# Patient Record
Sex: MALE | Race: WHITE | NOT HISPANIC OR LATINO | Employment: FULL TIME | URBAN - METROPOLITAN AREA
[De-identification: names, ages, dates, MRNs, and addresses within clinical notes are randomized per-mention and may not be internally consistent; named-entity substitution may affect disease eponyms.]

---

## 2023-03-06 ENCOUNTER — PRE-ADMISSION TESTING (OUTPATIENT)
Dept: PREADMISSION TESTING | Facility: HOSPITAL | Age: 68
End: 2023-03-06
Payer: MEDICARE

## 2023-03-06 VITALS
SYSTOLIC BLOOD PRESSURE: 140 MMHG | OXYGEN SATURATION: 94 % | HEIGHT: 70 IN | DIASTOLIC BLOOD PRESSURE: 88 MMHG | WEIGHT: 210.98 LBS | HEART RATE: 115 BPM | BODY MASS INDEX: 30.2 KG/M2 | RESPIRATION RATE: 20 BRPM

## 2023-03-06 LAB
ANION GAP SERPL CALCULATED.3IONS-SCNC: 11.7 MMOL/L (ref 5–15)
BUN SERPL-MCNC: 21 MG/DL (ref 8–23)
BUN/CREAT SERPL: 17.9 (ref 7–25)
CALCIUM SPEC-SCNC: 9 MG/DL (ref 8.6–10.5)
CHLORIDE SERPL-SCNC: 100 MMOL/L (ref 98–107)
CO2 SERPL-SCNC: 22.3 MMOL/L (ref 22–29)
CREAT SERPL-MCNC: 1.17 MG/DL (ref 0.76–1.27)
DEPRECATED RDW RBC AUTO: 48.7 FL (ref 37–54)
EGFRCR SERPLBLD CKD-EPI 2021: 69.2 ML/MIN/1.73
ERYTHROCYTE [DISTWIDTH] IN BLOOD BY AUTOMATED COUNT: 18.1 % (ref 12.3–15.4)
GLUCOSE SERPL-MCNC: 110 MG/DL (ref 65–99)
HCT VFR BLD AUTO: 50.9 % (ref 37.5–51)
HGB BLD-MCNC: 15.7 G/DL (ref 13–17.7)
MCH RBC QN AUTO: 24.8 PG (ref 26.6–33)
MCHC RBC AUTO-ENTMCNC: 30.8 G/DL (ref 31.5–35.7)
MCV RBC AUTO: 80.3 FL (ref 79–97)
PLATELET # BLD AUTO: 167 10*3/MM3 (ref 140–450)
PMV BLD AUTO: 9.7 FL (ref 6–12)
POTASSIUM SERPL-SCNC: 3.8 MMOL/L (ref 3.5–5.2)
QT INTERVAL: 342 MS
RBC # BLD AUTO: 6.34 10*6/MM3 (ref 4.14–5.8)
SODIUM SERPL-SCNC: 134 MMOL/L (ref 136–145)
WBC NRBC COR # BLD: 4.24 10*3/MM3 (ref 3.4–10.8)

## 2023-03-06 PROCEDURE — 93010 ELECTROCARDIOGRAM REPORT: CPT | Performed by: INTERNAL MEDICINE

## 2023-03-06 PROCEDURE — 93005 ELECTROCARDIOGRAM TRACING: CPT

## 2023-03-06 PROCEDURE — 80048 BASIC METABOLIC PNL TOTAL CA: CPT | Performed by: ORTHOPAEDIC SURGERY

## 2023-03-06 PROCEDURE — 85027 COMPLETE CBC AUTOMATED: CPT | Performed by: ORTHOPAEDIC SURGERY

## 2023-03-06 PROCEDURE — 36415 COLL VENOUS BLD VENIPUNCTURE: CPT

## 2023-03-06 RX ORDER — CARVEDILOL 12.5 MG/1
1 TABLET ORAL EVERY 12 HOURS SCHEDULED
COMMUNITY
Start: 2023-01-04

## 2023-03-06 RX ORDER — CETIRIZINE HYDROCHLORIDE 10 MG/1
1 TABLET ORAL DAILY
COMMUNITY
Start: 2023-02-27

## 2023-03-06 RX ORDER — PREGABALIN 300 MG/1
CAPSULE ORAL
COMMUNITY
Start: 2023-02-26

## 2023-03-06 RX ORDER — TESTOSTERONE ENANTHATE 200 MG/ML
INJECTION, SOLUTION INTRAMUSCULAR
COMMUNITY

## 2023-03-06 RX ORDER — LISINOPRIL 5 MG/1
1 TABLET ORAL DAILY
COMMUNITY
Start: 2023-01-04

## 2023-03-06 RX ORDER — ASPIRIN 81 MG/1
81 TABLET, CHEWABLE ORAL DAILY
COMMUNITY

## 2023-03-06 RX ORDER — MULTIPLE VITAMINS W/ MINERALS TAB 9MG-400MCG
1 TAB ORAL DAILY
COMMUNITY

## 2023-03-06 RX ORDER — DEXTROAMPHETAMINE SACCHARATE, AMPHETAMINE ASPARTATE, DEXTROAMPHETAMINE SULFATE AND AMPHETAMINE SULFATE 5; 5; 5; 5 MG/1; MG/1; MG/1; MG/1
1 TABLET ORAL 3 TIMES DAILY
COMMUNITY
Start: 2023-02-14

## 2023-03-06 NOTE — PAT
Pt here for PAT visit.  Pre-op tests completed, chg soap given, and instructions reviewed.  Instructed clears until 2 hrs prior to arrival time, voiced understanding. ERAS handouts given and reviewed with pt

## 2023-03-06 NOTE — DISCHARGE INSTRUCTIONS
PRE-ADMISSION TESTING INSTRUCTIONS FOR ADULTS    Take these medications the morning of surgery with a small sip of water: carvedilol,       Do not take any insulin or diabetes medications the morning of surgery.      No aspirin, advil, aleve, ibuprofen, naproxen, diet pills, decongestants, or herbal/vitamins for a week prior to surgery.       Tylenol/Acetaminophen is okay to take if needed.    General Instructions:    DO NOT EAT SOLID FOOD AFTER MIDNIGHT THE NIGHT BEFORE SURGERY. No gum, mints, or hard candy after midnight the night before surgery.  You may drink clear liquids the day of surgery up until 2 hours before your arrival time.  Clear liquids are liquids you can see through. Nothing RED in color.    Plain water    Sports drinks      Gelatin (Jell-O)  Fruit juices without pulp such as white grape juice and apple juice  Popsicles that contain no fruit or yogurt  Tea or coffee (no cream or milk added)    It is beneficial for you to have a clear drink that contains carbohydrates 2 hours before your arrival time.  We suggest a 20 ounce bottle of Gatorade or Powerade for non-diabetic patients or a 20 ounce bottle of Gatorade Zero or Powerade Zero for diabetic patients.     Patients who avoid smoking, chewing tobacco and alcohol for 4 weeks prior to surgery have a reduced risk of post-operative complications.  If at all possible, quit smoking as many days before surgery as you can.    Do not smoke, use chewing tobacco or drink alcohol the day of surgery    Bring your C-PAP/ BI-PAP machine if you use one.  Wear clean comfortable clothes and socks.  Do not wear contact lenses, lotion, deodorant, or make-up.  Bring a case for your glasses if applicable. You may brush your teeth the morning of surgery.  You may wear dentures/partials, do not put adhesive/glue on them.  Leave all other jewelry and valuables at home.      Preventing a Surgical Site Infection:    Shower the night before and on the morning of surgery  using the chlorhexidine soap you were given.  Use a clean washcloth with the soap.  Place clean sheets on your bed after showering the night before surgery. Do not use the CHG soap on your hair, face, or private areas. Wash your body gently for five (5) minutes. Do not scrub your skin.  Dry with a clean towel and dress in clean clothing.  Do not shave the surgical area for 10 days-2 weeks prior to surgery  because the razor can irritate skin and make it easier to develop an infection.  Make sure you, your family, and all healthcare providers clean their hands with soap and water or an alcohol based hand  before caring for you or your wound.      Day of surgery:    Your surgeon’s office will advise you of your arrival time for the day of surgery.    Upon arrival, a Pre-op nurse and Anesthesia provider will review your health history, obtain vital signs, and answer questions you may have. The anesthesia provider will also discuss the type of anesthesia that will be needed for your procedure, which may include general anesthesia. The only belongings needed at this time will be your home medications and if applicable your C-PAP/BI-PAP machine.  If you are staying overnight your family can leave the rest of your belongings in the car and bring them to your room later.  A Pre-op nurse will start an IV and you may receive medication in preparation for surgery, including something to help you relax.  Your family will be able to see you in the Pre-op area.  While you are in surgery your family should notify the waiting room  if they leave the waiting room area and provide a contact phone number.    IF you have any questions, you can call the Pre-Admission Department at [phone] or your surgeon's office.  Notify your surgeon if  you become sick, have a fever, productive cough, or cannot be here the day of surgery    Please be aware that surgery does come with discomfort.  We want to make every  effort to control your discomfort so please discuss any uncontrolled symptoms with your nurse.   Your doctor will most likely have prescribed pain medications.      If you are going home after surgery, you will receive individualized written care instructions before being discharged.  A responsible adult (over the age of 18) must drive you to and from the hospital on the day of your surgery and stay with you for 24 hours after anesthesia.    If you are staying overnight following surgery, you will be transported to your hospital room following the recovery period.  [facility] has all private rooms.    You may receive a survey regarding the care you received. Your feedback is very important and will be used to collect the necessary data to help us to continue to provide excellent care.     Deductibles and co-payments are collected on the day of service. Please be prepared to pay the required co-pay, deductible or deposit on the day of service as defined by your plan.

## 2023-03-15 NOTE — DISCHARGE PLACEMENT REQUEST
"Nancy Roverto (67 y.o. Male)     Date of Birth   1955    Social Security Number       Address   [address] [place] [address]    Home Phone   [phone]    MRN   [id number]       Yarsanism   Caodaism    Marital Status                               Admission Date       Admission Type   Elective    Admitting Provider   Roverto Mukherjee MD    Attending Provider   Roverto Mukherjee MD    Department, Room/Bed   [facility], --/--       Discharge Date       Discharge Disposition       Discharge Destination                               Attending Provider: Roverto Mukherjee MD    Allergies: Demerol [Meperidine]    Isolation: None   Infection: None   Code Status: Not on file    Ht: 177.8 cm (70\")   Wt: 95.7 kg (210 lb 15.7 oz)    Admission Cmt: None   Principal Problem: None                Active Insurance as of 3/20/2023     Primary Coverage     Payor Plan Insurance Group Employer/Plan Group    HUMANA MEDICARE REPLACEMENT HUMANA MEDICARE REPLACEMENT [id number]     Payor Plan Address Payor Plan Phone Number Payor Plan Fax Number Effective Dates    [address] [address] [phone]  1/1/2023 - None Entered    [facility] [id number]       Subscriber Name Subscriber Birth Date Member ID       ROVERTO CRUZ 1955 [id number]                 Emergency Contacts      (Rel.) Home Phone Work Phone Mobile Phone    Erin Cruz (Spouse) -- -- [phone]          "

## 2023-03-15 NOTE — CASE MANAGEMENT/SOCIAL WORK
Continued Stay Note  ERASMO Hernández     Patient Name: Frederick Cruz  MRN: [id number]  Today's Date: 3/15/2023    Admit Date: (Not on file)    [facility]    Discharge Plan     Row Name 03/15/23 1520       Plan    Plan Home with wife and [facility]    Patient/Family in Agreement with Plan yes    Plan Comments CM spoke with patient today regarding pre admission discharge planning for his surgery with Dr. Mukherjee on 3/20/23. Patient states he lives with his wife Nasreen in a single story house with one step and handrail to gain entry. He states he currently has no issues entering the home or maneuvering inside. He states his wife will be able to assist with needs at home and provide ride at discharge. CM informed patient that Karolina has arrange for [facility] to follow for PT at home and he is agreeable this service. Referral entered into EPIC. We then discussed DME and he states he has a rolling walker, BSC, shower chair, straight cane and quad cane at home and states he will not need any other equipment at discharge. Patient states he plans on returning home with his wife to help and [facility] to follow for PT. CM will follow.               Discharge Codes    No documentation.                     Melyssa Kauffman RN

## 2023-03-17 ENCOUNTER — ANESTHESIA EVENT (OUTPATIENT)
Dept: PERIOP | Facility: HOSPITAL | Age: 68
DRG: 468 | End: 2023-03-17
Payer: MEDICARE

## 2023-03-20 ENCOUNTER — ANESTHESIA (OUTPATIENT)
Dept: PERIOP | Facility: HOSPITAL | Age: 68
DRG: 468 | End: 2023-03-20
Payer: MEDICARE

## 2023-03-20 ENCOUNTER — HOSPITAL ENCOUNTER (INPATIENT)
Facility: HOSPITAL | Age: 68
LOS: 1 days | Discharge: HOME HEALTH | DRG: 468 | End: 2023-03-21
Attending: ORTHOPAEDIC SURGERY | Admitting: ORTHOPAEDIC SURGERY
Payer: MEDICARE

## 2023-03-20 ENCOUNTER — APPOINTMENT (OUTPATIENT)
Dept: GENERAL RADIOLOGY | Facility: HOSPITAL | Age: 68
DRG: 468 | End: 2023-03-20
Payer: MEDICARE

## 2023-03-20 DIAGNOSIS — T84.84XA PAIN IN PROSTHETIC JOINT: ICD-10-CM

## 2023-03-20 PROBLEM — T84.012S FAILED TOTAL KNEE, RIGHT, SEQUELA: Status: ACTIVE | Noted: 2023-03-20

## 2023-03-20 PROCEDURE — 25010000002 TRIAMCINOLONE PER 10 MG: Performed by: ORTHOPAEDIC SURGERY

## 2023-03-20 PROCEDURE — 0 CEFAZOLIN SODIUM-DEXTROSE 2-3 GM-%(50ML) RECONSTITUTED SOLUTION: Performed by: ORTHOPAEDIC SURGERY

## 2023-03-20 PROCEDURE — C1776 JOINT DEVICE (IMPLANTABLE): HCPCS | Performed by: ORTHOPAEDIC SURGERY

## 2023-03-20 PROCEDURE — 25010000002 PHENYLEPHRINE 10 MG/ML SOLUTION 1 ML VIAL: Performed by: NURSE ANESTHETIST, CERTIFIED REGISTERED

## 2023-03-20 PROCEDURE — 25010000002 PHENYLEPHRINE 10 MG/ML SOLUTION: Performed by: NURSE ANESTHETIST, CERTIFIED REGISTERED

## 2023-03-20 PROCEDURE — 25010000002 PROPOFOL 200 MG/20ML EMULSION: Performed by: NURSE ANESTHETIST, CERTIFIED REGISTERED

## 2023-03-20 PROCEDURE — 25010000002 ROPIVACAINE PER 1 MG: Performed by: ORTHOPAEDIC SURGERY

## 2023-03-20 PROCEDURE — 0SPC0JZ REMOVAL OF SYNTHETIC SUBSTITUTE FROM RIGHT KNEE JOINT, OPEN APPROACH: ICD-10-PCS | Performed by: ORTHOPAEDIC SURGERY

## 2023-03-20 PROCEDURE — 25010000002 MAGNESIUM SULFATE PER 500 MG OF MAGNESIUM: Performed by: NURSE ANESTHETIST, CERTIFIED REGISTERED

## 2023-03-20 PROCEDURE — C1713 ANCHOR/SCREW BN/BN,TIS/BN: HCPCS | Performed by: ORTHOPAEDIC SURGERY

## 2023-03-20 PROCEDURE — 94799 UNLISTED PULMONARY SVC/PX: CPT

## 2023-03-20 PROCEDURE — 25010000002 VANCOMYCIN 1.5 G RECONSTITUTED SOLUTION 1 EACH VIAL: Performed by: ORTHOPAEDIC SURGERY

## 2023-03-20 PROCEDURE — 25010000002 FENTANYL CITRATE (PF) 100 MCG/2ML SOLUTION: Performed by: NURSE ANESTHETIST, CERTIFIED REGISTERED

## 2023-03-20 PROCEDURE — 99221 1ST HOSP IP/OBS SF/LOW 40: CPT | Performed by: HOSPITALIST

## 2023-03-20 PROCEDURE — 87070 CULTURE OTHR SPECIMN AEROBIC: CPT | Performed by: ORTHOPAEDIC SURGERY

## 2023-03-20 PROCEDURE — 25010000002 MIDAZOLAM PER 1MG: Performed by: NURSE ANESTHETIST, CERTIFIED REGISTERED

## 2023-03-20 PROCEDURE — 25010000002 DEXAMETHASONE PER 1 MG: Performed by: NURSE ANESTHETIST, CERTIFIED REGISTERED

## 2023-03-20 PROCEDURE — 94761 N-INVAS EAR/PLS OXIMETRY MLT: CPT

## 2023-03-20 PROCEDURE — 25010000002 ONDANSETRON PER 1 MG: Performed by: NURSE ANESTHETIST, CERTIFIED REGISTERED

## 2023-03-20 PROCEDURE — 87075 CULTR BACTERIA EXCEPT BLOOD: CPT | Performed by: ORTHOPAEDIC SURGERY

## 2023-03-20 PROCEDURE — 0SRC0J9 REPLACEMENT OF RIGHT KNEE JOINT WITH SYNTHETIC SUBSTITUTE, CEMENTED, OPEN APPROACH: ICD-10-PCS | Performed by: ORTHOPAEDIC SURGERY

## 2023-03-20 PROCEDURE — 73560 X-RAY EXAM OF KNEE 1 OR 2: CPT

## 2023-03-20 PROCEDURE — 25010000002 KETOROLAC TROMETHAMINE PER 15 MG: Performed by: ORTHOPAEDIC SURGERY

## 2023-03-20 PROCEDURE — 87176 TISSUE HOMOGENIZATION CULTR: CPT | Performed by: ORTHOPAEDIC SURGERY

## 2023-03-20 PROCEDURE — 25010000002 EPINEPHRINE 1 MG/ML SOLUTION 1 ML VIAL: Performed by: ORTHOPAEDIC SURGERY

## 2023-03-20 PROCEDURE — 87205 SMEAR GRAM STAIN: CPT | Performed by: ORTHOPAEDIC SURGERY

## 2023-03-20 PROCEDURE — 25010000002 VANCOMYCIN 1 G RECONSTITUTED SOLUTION: Performed by: ORTHOPAEDIC SURGERY

## 2023-03-20 DEVICE — CMT BONE PALACOS RPLSG W/GENT HI/VISC 1X40: Type: IMPLANTABLE DEVICE | Site: KNEE | Status: FUNCTIONAL

## 2023-03-20 DEVICE — LEGION OXINIUM CONSTRAINED FEMORAL                                    SIZE 6 RIGHT
Type: IMPLANTABLE DEVICE | Site: KNEE | Status: FUNCTIONAL
Brand: LEGION

## 2023-03-20 DEVICE — GENESIS II CONSTRAINED ART INSERT                                    SIZE 5-6 11MM
Type: IMPLANTABLE DEVICE | Site: KNEE | Status: FUNCTIONAL
Brand: GENESIS II

## 2023-03-20 DEVICE — LEGION TIB CONE ID 22 SHORT
Type: IMPLANTABLE DEVICE | Site: KNEE | Status: FUNCTIONAL
Brand: LEGION

## 2023-03-20 DEVICE — LEGION PRESSFIT STEM 18MM X 160MM
Type: IMPLANTABLE DEVICE | Site: KNEE | Status: FUNCTIONAL
Brand: LEGION

## 2023-03-20 DEVICE — LEGION SCREW-ON DISTAL FEM WDG SZ6 10MM
Type: IMPLANTABLE DEVICE | Site: KNEE | Status: FUNCTIONAL
Brand: LEGION

## 2023-03-20 DEVICE — LEGION SCREW ON HEMI STEPPED                                    TIBIAL WEDGE SIZE 5-6 5MM LEFT                                    MEDIAL/RIGHT LATERAL
Type: IMPLANTABLE DEVICE | Site: KNEE | Status: FUNCTIONAL
Brand: LEGION

## 2023-03-20 DEVICE — LEGION SCREW ON HEMI STEPPED                                    TIBIAL WEDGE SIZE 5-6 5MM LEFT                                    LATERAL/RIGHT MEDIAL
Type: IMPLANTABLE DEVICE | Site: KNEE | Status: FUNCTIONAL
Brand: LEGION

## 2023-03-20 DEVICE — DEV CONTRL TISS STRATAFIX SPIRAL PDO BIDIR 1 36X36CM: Type: IMPLANTABLE DEVICE | Site: KNEE | Status: FUNCTIONAL

## 2023-03-20 DEVICE — DEV CONTRL TISS STRATAFIX SPIRAL PDS PLS CT1 2-0 45CM: Type: IMPLANTABLE DEVICE | Site: KNEE | Status: FUNCTIONAL

## 2023-03-20 DEVICE — LEGION PRESSFIT STEM 15MM X 120MM
Type: IMPLANTABLE DEVICE | Site: KNEE | Status: FUNCTIONAL
Brand: LEGION

## 2023-03-20 DEVICE — LEGION REVISION TIBIAL BASEPLATE                                    SIZE 5 RIGHT
Type: IMPLANTABLE DEVICE | Site: KNEE | Status: FUNCTIONAL
Brand: LEGION

## 2023-03-20 DEVICE — LEGION SCREW-ON LWEDGE 10MM DISTAL                                    X 5MM POSTERIOR SIZE 6
Type: IMPLANTABLE DEVICE | Site: KNEE | Status: FUNCTIONAL
Brand: LEGION

## 2023-03-20 RX ORDER — OXYCODONE HYDROCHLORIDE 5 MG/1
10 TABLET ORAL EVERY 4 HOURS PRN
Status: DISCONTINUED | OUTPATIENT
Start: 2023-03-20 | End: 2023-03-21 | Stop reason: HOSPADM

## 2023-03-20 RX ORDER — MIDAZOLAM HYDROCHLORIDE 2 MG/2ML
1 INJECTION, SOLUTION INTRAMUSCULAR; INTRAVENOUS
Status: DISCONTINUED | OUTPATIENT
Start: 2023-03-20 | End: 2023-03-20 | Stop reason: HOSPADM

## 2023-03-20 RX ORDER — TRANEXAMIC ACID 10 MG/ML
INJECTION, SOLUTION INTRAVENOUS AS NEEDED
Status: DISCONTINUED | OUTPATIENT
Start: 2023-03-20 | End: 2023-03-20 | Stop reason: SURG

## 2023-03-20 RX ORDER — PHENYLEPHRINE HYDROCHLORIDE 10 MG/ML
INJECTION INTRAVENOUS AS NEEDED
Status: DISCONTINUED | OUTPATIENT
Start: 2023-03-20 | End: 2023-03-20 | Stop reason: SURG

## 2023-03-20 RX ORDER — DEXAMETHASONE SODIUM PHOSPHATE 4 MG/ML
4 INJECTION, SOLUTION INTRA-ARTICULAR; INTRALESIONAL; INTRAMUSCULAR; INTRAVENOUS; SOFT TISSUE ONCE
Status: COMPLETED | OUTPATIENT
Start: 2023-03-20 | End: 2023-03-20

## 2023-03-20 RX ORDER — OXYCODONE HYDROCHLORIDE AND ACETAMINOPHEN 5; 325 MG/1; MG/1
1 TABLET ORAL ONCE AS NEEDED
Status: DISCONTINUED | OUTPATIENT
Start: 2023-03-20 | End: 2023-03-20 | Stop reason: HOSPADM

## 2023-03-20 RX ORDER — SODIUM CHLORIDE AND POTASSIUM CHLORIDE 150; 900 MG/100ML; MG/100ML
100 INJECTION, SOLUTION INTRAVENOUS CONTINUOUS
Status: DISCONTINUED | OUTPATIENT
Start: 2023-03-20 | End: 2023-03-21 | Stop reason: HOSPADM

## 2023-03-20 RX ORDER — NALOXONE HCL 0.4 MG/ML
0.4 VIAL (ML) INJECTION
Status: DISCONTINUED | OUTPATIENT
Start: 2023-03-20 | End: 2023-03-21 | Stop reason: HOSPADM

## 2023-03-20 RX ORDER — CHOLECALCIFEROL (VITAMIN D3) 125 MCG
5 CAPSULE ORAL NIGHTLY PRN
Status: DISCONTINUED | OUTPATIENT
Start: 2023-03-20 | End: 2023-03-21 | Stop reason: HOSPADM

## 2023-03-20 RX ORDER — BUPIVACAINE HYDROCHLORIDE 2.5 MG/ML
INJECTION, SOLUTION EPIDURAL; INFILTRATION; INTRACAUDAL
Status: COMPLETED | OUTPATIENT
Start: 2023-03-20 | End: 2023-03-20

## 2023-03-20 RX ORDER — KETAMINE HYDROCHLORIDE 10 MG/ML
INJECTION INTRAMUSCULAR; INTRAVENOUS AS NEEDED
Status: DISCONTINUED | OUTPATIENT
Start: 2023-03-20 | End: 2023-03-20 | Stop reason: SURG

## 2023-03-20 RX ORDER — SODIUM CHLORIDE 9 MG/ML
40 INJECTION, SOLUTION INTRAVENOUS AS NEEDED
Status: DISCONTINUED | OUTPATIENT
Start: 2023-03-20 | End: 2023-03-20 | Stop reason: HOSPADM

## 2023-03-20 RX ORDER — LIDOCAINE HYDROCHLORIDE 20 MG/ML
INJECTION, SOLUTION INFILTRATION; PERINEURAL AS NEEDED
Status: DISCONTINUED | OUTPATIENT
Start: 2023-03-20 | End: 2023-03-20 | Stop reason: SURG

## 2023-03-20 RX ORDER — FAMOTIDINE 10 MG/ML
20 INJECTION, SOLUTION INTRAVENOUS
Status: COMPLETED | OUTPATIENT
Start: 2023-03-20 | End: 2023-03-20

## 2023-03-20 RX ORDER — DEXMEDETOMIDINE HYDROCHLORIDE 100 UG/ML
INJECTION, SOLUTION INTRAVENOUS AS NEEDED
Status: DISCONTINUED | OUTPATIENT
Start: 2023-03-20 | End: 2023-03-20 | Stop reason: SURG

## 2023-03-20 RX ORDER — NALOXONE HCL 0.4 MG/ML
0.1 VIAL (ML) INJECTION
Status: DISCONTINUED | OUTPATIENT
Start: 2023-03-20 | End: 2023-03-21 | Stop reason: HOSPADM

## 2023-03-20 RX ORDER — LIDOCAINE HYDROCHLORIDE 10 MG/ML
0.5 INJECTION, SOLUTION EPIDURAL; INFILTRATION; INTRACAUDAL; PERINEURAL ONCE AS NEEDED
Status: DISCONTINUED | OUTPATIENT
Start: 2023-03-20 | End: 2023-03-20 | Stop reason: HOSPADM

## 2023-03-20 RX ORDER — SODIUM CHLORIDE 0.9 % (FLUSH) 0.9 %
10 SYRINGE (ML) INJECTION EVERY 12 HOURS SCHEDULED
Status: DISCONTINUED | OUTPATIENT
Start: 2023-03-20 | End: 2023-03-20 | Stop reason: HOSPADM

## 2023-03-20 RX ORDER — ONDANSETRON 4 MG/1
4 TABLET, FILM COATED ORAL EVERY 6 HOURS PRN
Status: DISCONTINUED | OUTPATIENT
Start: 2023-03-20 | End: 2023-03-21 | Stop reason: HOSPADM

## 2023-03-20 RX ORDER — ACETAMINOPHEN 325 MG/1
325 TABLET ORAL EVERY 4 HOURS PRN
Status: DISCONTINUED | OUTPATIENT
Start: 2023-03-20 | End: 2023-03-21 | Stop reason: HOSPADM

## 2023-03-20 RX ORDER — SODIUM CHLORIDE, SODIUM LACTATE, POTASSIUM CHLORIDE, CALCIUM CHLORIDE 600; 310; 30; 20 MG/100ML; MG/100ML; MG/100ML; MG/100ML
100 INJECTION, SOLUTION INTRAVENOUS CONTINUOUS
Status: DISCONTINUED | OUTPATIENT
Start: 2023-03-20 | End: 2023-03-21 | Stop reason: HOSPADM

## 2023-03-20 RX ORDER — CEFAZOLIN SODIUM 2 G/50ML
2 SOLUTION INTRAVENOUS EVERY 8 HOURS
Status: COMPLETED | OUTPATIENT
Start: 2023-03-20 | End: 2023-03-21

## 2023-03-20 RX ORDER — SODIUM CHLORIDE 0.9 % (FLUSH) 0.9 %
10 SYRINGE (ML) INJECTION AS NEEDED
Status: DISCONTINUED | OUTPATIENT
Start: 2023-03-20 | End: 2023-03-20 | Stop reason: HOSPADM

## 2023-03-20 RX ORDER — FENTANYL CITRATE 50 UG/ML
INJECTION, SOLUTION INTRAMUSCULAR; INTRAVENOUS AS NEEDED
Status: DISCONTINUED | OUTPATIENT
Start: 2023-03-20 | End: 2023-03-20 | Stop reason: SURG

## 2023-03-20 RX ORDER — TRAMADOL HYDROCHLORIDE 50 MG/1
50 TABLET ORAL EVERY 8 HOURS PRN
Status: DISCONTINUED | OUTPATIENT
Start: 2023-03-20 | End: 2023-03-21 | Stop reason: HOSPADM

## 2023-03-20 RX ORDER — FAMOTIDINE 20 MG/1
20 TABLET, FILM COATED ORAL
Status: COMPLETED | OUTPATIENT
Start: 2023-03-20 | End: 2023-03-20

## 2023-03-20 RX ORDER — PREGABALIN 75 MG/1
300 CAPSULE ORAL EVERY 12 HOURS SCHEDULED
Status: DISCONTINUED | OUTPATIENT
Start: 2023-03-20 | End: 2023-03-21 | Stop reason: HOSPADM

## 2023-03-20 RX ORDER — CETIRIZINE HYDROCHLORIDE 10 MG/1
10 TABLET ORAL DAILY
Status: DISCONTINUED | OUTPATIENT
Start: 2023-03-21 | End: 2023-03-21 | Stop reason: HOSPADM

## 2023-03-20 RX ORDER — MAGNESIUM SULFATE HEPTAHYDRATE 500 MG/ML
INJECTION, SOLUTION INTRAMUSCULAR; INTRAVENOUS AS NEEDED
Status: DISCONTINUED | OUTPATIENT
Start: 2023-03-20 | End: 2023-03-20 | Stop reason: SURG

## 2023-03-20 RX ORDER — PROPOFOL 10 MG/ML
INJECTION, EMULSION INTRAVENOUS AS NEEDED
Status: DISCONTINUED | OUTPATIENT
Start: 2023-03-20 | End: 2023-03-20 | Stop reason: SURG

## 2023-03-20 RX ORDER — OXYCODONE AND ACETAMINOPHEN 7.5; 325 MG/1; MG/1
1 TABLET ORAL EVERY 4 HOURS PRN
Status: DISCONTINUED | OUTPATIENT
Start: 2023-03-20 | End: 2023-03-21 | Stop reason: HOSPADM

## 2023-03-20 RX ORDER — ACETAMINOPHEN 500 MG
1000 TABLET ORAL ONCE
Status: COMPLETED | OUTPATIENT
Start: 2023-03-20 | End: 2023-03-20

## 2023-03-20 RX ORDER — CEFAZOLIN SODIUM 2 G/50ML
2 SOLUTION INTRAVENOUS ONCE
Status: COMPLETED | OUTPATIENT
Start: 2023-03-20 | End: 2023-03-20

## 2023-03-20 RX ORDER — ONDANSETRON 2 MG/ML
4 INJECTION INTRAMUSCULAR; INTRAVENOUS ONCE
Status: COMPLETED | OUTPATIENT
Start: 2023-03-20 | End: 2023-03-20

## 2023-03-20 RX ORDER — MELOXICAM 7.5 MG/1
7.5 TABLET ORAL ONCE
Status: COMPLETED | OUTPATIENT
Start: 2023-03-20 | End: 2023-03-20

## 2023-03-20 RX ORDER — TRIAMCINOLONE ACETONIDE 40 MG/ML
INJECTION, SUSPENSION INTRA-ARTICULAR; INTRAMUSCULAR AS NEEDED
Status: DISCONTINUED | OUTPATIENT
Start: 2023-03-20 | End: 2023-03-20 | Stop reason: HOSPADM

## 2023-03-20 RX ORDER — ONDANSETRON 2 MG/ML
4 INJECTION INTRAMUSCULAR; INTRAVENOUS EVERY 6 HOURS PRN
Status: DISCONTINUED | OUTPATIENT
Start: 2023-03-20 | End: 2023-03-21 | Stop reason: HOSPADM

## 2023-03-20 RX ORDER — ONDANSETRON 2 MG/ML
4 INJECTION INTRAMUSCULAR; INTRAVENOUS ONCE AS NEEDED
Status: DISCONTINUED | OUTPATIENT
Start: 2023-03-20 | End: 2023-03-20 | Stop reason: HOSPADM

## 2023-03-20 RX ORDER — LISINOPRIL 10 MG/1
5 TABLET ORAL DAILY
Status: DISCONTINUED | OUTPATIENT
Start: 2023-03-21 | End: 2023-03-21 | Stop reason: HOSPADM

## 2023-03-20 RX ORDER — FAMOTIDINE 20 MG/1
40 TABLET, FILM COATED ORAL DAILY
Status: DISCONTINUED | OUTPATIENT
Start: 2023-03-21 | End: 2023-03-21 | Stop reason: HOSPADM

## 2023-03-20 RX ORDER — GABAPENTIN 300 MG/1
300 CAPSULE ORAL ONCE
Status: COMPLETED | OUTPATIENT
Start: 2023-03-20 | End: 2023-03-20

## 2023-03-20 RX ORDER — MAGNESIUM HYDROXIDE 1200 MG/15ML
LIQUID ORAL AS NEEDED
Status: DISCONTINUED | OUTPATIENT
Start: 2023-03-20 | End: 2023-03-20 | Stop reason: HOSPADM

## 2023-03-20 RX ORDER — SODIUM CHLORIDE, SODIUM LACTATE, POTASSIUM CHLORIDE, CALCIUM CHLORIDE 600; 310; 30; 20 MG/100ML; MG/100ML; MG/100ML; MG/100ML
9 INJECTION, SOLUTION INTRAVENOUS CONTINUOUS
Status: DISCONTINUED | OUTPATIENT
Start: 2023-03-20 | End: 2023-03-21 | Stop reason: HOSPADM

## 2023-03-20 RX ORDER — CARVEDILOL 6.25 MG/1
12.5 TABLET ORAL EVERY 12 HOURS SCHEDULED
Status: DISCONTINUED | OUTPATIENT
Start: 2023-03-20 | End: 2023-03-21 | Stop reason: HOSPADM

## 2023-03-20 RX ADMIN — ONDANSETRON 4 MG: 2 INJECTION INTRAMUSCULAR; INTRAVENOUS at 09:15

## 2023-03-20 RX ADMIN — LIDOCAINE HYDROCHLORIDE 100 MG: 20 INJECTION, SOLUTION INFILTRATION; PERINEURAL at 12:32

## 2023-03-20 RX ADMIN — PHENYLEPHRINE HYDROCHLORIDE 100 MCG: 10 INJECTION INTRAVENOUS at 15:05

## 2023-03-20 RX ADMIN — PHENYLEPHRINE HYDROCHLORIDE 100 MCG: 10 INJECTION INTRAVENOUS at 12:43

## 2023-03-20 RX ADMIN — PHENYLEPHRINE HYDROCHLORIDE 100 MCG: 10 INJECTION INTRAVENOUS at 12:48

## 2023-03-20 RX ADMIN — PHENYLEPHRINE HYDROCHLORIDE 100 MCG: 10 INJECTION INTRAVENOUS at 13:11

## 2023-03-20 RX ADMIN — LIDOCAINE HYDROCHLORIDE 0.5 ML: 10 INJECTION, SOLUTION EPIDURAL; INFILTRATION; INTRACAUDAL; PERINEURAL at 11:57

## 2023-03-20 RX ADMIN — FAMOTIDINE 20 MG: 10 INJECTION, SOLUTION INTRAVENOUS at 09:15

## 2023-03-20 RX ADMIN — FENTANYL CITRATE 25 MCG: 50 INJECTION, SOLUTION INTRAMUSCULAR; INTRAVENOUS at 13:02

## 2023-03-20 RX ADMIN — MIDAZOLAM HYDROCHLORIDE 1 MG: 1 INJECTION, SOLUTION INTRAMUSCULAR; INTRAVENOUS at 12:06

## 2023-03-20 RX ADMIN — BUPIVACAINE HYDROCHLORIDE 10 ML: 2.5 INJECTION, SOLUTION EPIDURAL; INFILTRATION; INTRACAUDAL at 12:18

## 2023-03-20 RX ADMIN — PHENYLEPHRINE HYDROCHLORIDE 200 MCG: 10 INJECTION INTRAVENOUS at 13:00

## 2023-03-20 RX ADMIN — MELOXICAM 7.5 MG: 7.5 TABLET ORAL at 09:23

## 2023-03-20 RX ADMIN — PHENYLEPHRINE HYDROCHLORIDE 100 MCG: 10 INJECTION INTRAVENOUS at 14:43

## 2023-03-20 RX ADMIN — KETAMINE HYDROCHLORIDE 20 MG: 10 INJECTION INTRAMUSCULAR; INTRAVENOUS at 12:32

## 2023-03-20 RX ADMIN — PROPOFOL INJECTABLE EMULSION 150 MG: 10 INJECTION, EMULSION INTRAVENOUS at 12:32

## 2023-03-20 RX ADMIN — PREGABALIN 300 MG: 75 CAPSULE ORAL at 20:11

## 2023-03-20 RX ADMIN — PHENYLEPHRINE HYDROCHLORIDE 100 MCG: 10 INJECTION INTRAVENOUS at 12:40

## 2023-03-20 RX ADMIN — SODIUM CHLORIDE, POTASSIUM CHLORIDE, SODIUM LACTATE AND CALCIUM CHLORIDE 9 ML/HR: 600; 310; 30; 20 INJECTION, SOLUTION INTRAVENOUS at 09:15

## 2023-03-20 RX ADMIN — DEXMEDETOMIDINE 20 MCG: 100 INJECTION, SOLUTION, CONCENTRATE INTRAVENOUS at 12:11

## 2023-03-20 RX ADMIN — CEFAZOLIN SODIUM 2 G: 2 SOLUTION INTRAVENOUS at 20:11

## 2023-03-20 RX ADMIN — PHENYLEPHRINE HYDROCHLORIDE 100 MCG: 10 INJECTION INTRAVENOUS at 12:52

## 2023-03-20 RX ADMIN — PHENYLEPHRINE HYDROCHLORIDE 100 MCG: 10 INJECTION INTRAVENOUS at 14:59

## 2023-03-20 RX ADMIN — FENTANYL CITRATE 25 MCG: 50 INJECTION, SOLUTION INTRAMUSCULAR; INTRAVENOUS at 13:41

## 2023-03-20 RX ADMIN — CARVEDILOL 12.5 MG: 6.25 TABLET, FILM COATED ORAL at 20:11

## 2023-03-20 RX ADMIN — DEXAMETHASONE SODIUM PHOSPHATE 4 MG: 4 INJECTION, SOLUTION INTRAMUSCULAR; INTRAVENOUS at 09:15

## 2023-03-20 RX ADMIN — FENTANYL CITRATE 25 MCG: 50 INJECTION, SOLUTION INTRAMUSCULAR; INTRAVENOUS at 14:15

## 2023-03-20 RX ADMIN — PHENYLEPHRINE HYDROCHLORIDE 100 MCG: 10 INJECTION INTRAVENOUS at 14:45

## 2023-03-20 RX ADMIN — DEXMEDETOMIDINE 20 MCG: 100 INJECTION, SOLUTION, CONCENTRATE INTRAVENOUS at 12:14

## 2023-03-20 RX ADMIN — GABAPENTIN 300 MG: 300 CAPSULE ORAL at 09:23

## 2023-03-20 RX ADMIN — TRANEXAMIC ACID 1000 MG: 10 INJECTION, SOLUTION INTRAVENOUS at 12:39

## 2023-03-20 RX ADMIN — FENTANYL CITRATE 25 MCG: 50 INJECTION, SOLUTION INTRAMUSCULAR; INTRAVENOUS at 12:52

## 2023-03-20 RX ADMIN — CEFAZOLIN SODIUM 2 G: 2 SOLUTION INTRAVENOUS at 12:39

## 2023-03-20 RX ADMIN — OXYCODONE HYDROCHLORIDE 10 MG: 5 TABLET ORAL at 18:15

## 2023-03-20 RX ADMIN — PHENYLEPHRINE HYDROCHLORIDE 100 MCG: 10 INJECTION INTRAVENOUS at 13:14

## 2023-03-20 RX ADMIN — MAGNESIUM SULFATE HEPTAHYDRATE 2 G: 500 INJECTION, SOLUTION INTRAMUSCULAR; INTRAVENOUS at 13:05

## 2023-03-20 RX ADMIN — ACETAMINOPHEN 1000 MG: 500 TABLET, FILM COATED ORAL at 09:23

## 2023-03-20 RX ADMIN — BUPIVACAINE HYDROCHLORIDE 10 ML: 2.5 INJECTION, SOLUTION EPIDURAL; INFILTRATION; INTRACAUDAL; PERINEURAL at 12:11

## 2023-03-20 RX ADMIN — KETAMINE HYDROCHLORIDE 10 MG: 10 INJECTION INTRAMUSCULAR; INTRAVENOUS at 14:30

## 2023-03-20 RX ADMIN — BUPIVACAINE HYDROCHLORIDE 20 ML: 2.5 INJECTION, SOLUTION EPIDURAL; INFILTRATION; INTRACAUDAL at 12:14

## 2023-03-20 RX ADMIN — VANCOMYCIN HYDROCHLORIDE 1500 MG: 1.5 INJECTION, POWDER, LYOPHILIZED, FOR SOLUTION INTRAVENOUS at 11:30

## 2023-03-20 RX ADMIN — PHENYLEPHRINE HYDROCHLORIDE 0.1 MCG/KG/MIN: 10 INJECTION INTRAVENOUS at 13:15

## 2023-03-20 NOTE — NURSING NOTE
"Patient presents to PACU with redness and discoloration in  RLE. Palpable DP pulse present and is 2+. Pt states he sees a \"vascular surgeon and this looks a lot better than normal\". RLE is cooler than LLE at this time. Anesthesia provider aware. No new orders at this time.   "

## 2023-03-20 NOTE — PLAN OF CARE
Goal Outcome Evaluation:  Plan of Care Reviewed With: patient        Progress: no change  Outcome Evaluation: Admit to MS from PACU. [place] to room, call light and plan of care. RLE noted Red and discolored, pt states it looks better than baseline at this time. IV saline locked. Tolerating PO. Voiding spontaneously, uses urinal. Pt given pain meds and educated about early ambulation. Hemovac in place w bloody drainage. Drsg to R Knee noted C/D/I. All care explained and all questions answered

## 2023-03-20 NOTE — ANESTHESIA PROCEDURE NOTES
Peripheral Block    Pre-sedation assessment completed: 3/20/2023 12:05 PM    Patient reassessed immediately prior to procedure    Patient location during procedure: pre-op  Start time: 3/20/2023 12:09 PM  Stop time: 3/20/2023 12:11 PM  Reason for block: at surgeon's request and post-op pain management  Performed by  CRNA/CAA: Corky Sullivan, JHONATAN: Tatiana Cartwright SRNA  Preanesthetic Checklist  Completed: patient identified, IV checked, site marked, risks and benefits discussed, surgical consent, monitors and equipment checked, pre-op evaluation and timeout performed  Prep:  Pt Position: supine  Sterile barriers:cap, gloves, mask and washed/disinfected hands  Prep: ChloraPrep  Patient monitoring: blood pressure monitoring, continuous pulse oximetry and EKG  Procedure    Sedation: yes  Performed under: local infiltration  Guidance:ultrasound guided    ULTRASOUND INTERPRETATION.  Using ultrasound guidance a 21 G gauge needle was placed in close proximity to the nerve, at which point, under ultrasound guidance anesthetic was injected in the area of the nerve and spread of the anesthesia was seen on ultrasound in close proximity thereto.  There were no abnormalities seen on ultrasound; a digital image was taken; and the patient tolerated the procedure with no complications. Images:still images obtained, printed/placed on chart    Laterality:right  Block Type:adductor canal block  Injection Technique:single-shot  Needle Type:echogenic  Needle Gauge:21 G  Resistance on Injection: none    Medications Used: bupivacaine PF (MARCAINE) injection 0.25% - Injection   10 mL - 3/20/2023 12:11:00 PM      Post Assessment  Injection Assessment: negative aspiration for heme, no paresthesia on injection and incremental injection  Patient Tolerance:comfortable throughout block  Complications:no

## 2023-03-20 NOTE — OP NOTE
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   Assistant(s):  1. TEZ Aguilar    Anesthesia: Spinal/Adductor Femoral Nerve/IPAC Block  Anesthesiologist:   Estimated blood loss: 50 milliliters   Drains: 15 Sinhala Hemovac  Specimens: 3 tissue cultures knee fluid, tibia, femur.    Complications: None apparent     Findings: Unstable knee replacement     INDICATIONS: Frederick Cruz is a pleasant  who presented to my office with a long history of knee pain following a primary TKA.  Their pain was mostly with starting to walk and upon trying to rise from a chair. The pain had become debilitating for them and they failed conservative therapy.  We discussed surgical treatment options including revision total knee arthroplasty.  The patient had a negative infectious work-up including labs and an aspiration. Prior to surgery we discussed the risks, benefits, alternatives to surgery, and surgical convalescence. Surgical consent was obtained both in the office, as well as the day of surgery. Risks of the procedure include, but are not limited to, infection, DVT, PE, damage to nerves or blood vessels at the time of surgery, bleeding and blood loss, stiffness/arthrofibrosis, and risk of loosening or failure of the components requiring revision surgery. We  also discussed the possibility of an occult infection and that multiple specimens would be taken in the operating room. Should these demonstrate anything concerning then he may require prolonged antibiotics and even an eventual two-staged procedure. The patient expressed understanding and wished to proceed with the surgery. An informed consent form was signed and placed on the chart prior to coming to the Operating Room.     PROCEDURE: The patient was brought to the operating room after lower extremity nerve blocks had been performed. Once obtaining satisfactory anesthesia was placed in the supine position. The knee was prepped and draped in the usual sterile fashion for a total knee replacement. Pre-operative antibiotics were given following SCIP guidelines. The leg was exsanguinated with an Esmarch bandage and the pneumatic tourniquet inflated to 250 mm mercury. The previous longitudinal incision was opened over the anterior aspect of the knee exposing the extensor mechanism. An arthrotomy was performed and the patella displaced laterally.     The gross pathologic findings revealed moderate synovitis and some osteolysis especially beneath the femoral component. Subperiosteal dissection was continued around the proximal medial tibia. The necessary soft tissue release was performed to correct the fixed angular deformity. Care was taken to protect and preserve the medial collateral ligament. The tibial polyethylene was removed and demonstrated some mild backside wear but did not demonstrated catastrophic failure. A culture was taken of the synovium. A saw and osteotomes was then inserted between the femoral component and the cement interface. The component was removed with minimal bone loss. The cement was then removed from the femur and a tissue culture was sent from the femoral surface. Moderate osteolysis was seen both on the posterior condyles and distally. The soft tissue membrane was then removed and all cement  and debris was removed from the femur. All areas of inflamed synovium were removed. There tibial component was removed in similar fashion. The cement was then removed from the proximal tibia utilizing osteotomes. An area of osteolysis was seen beneath the tibia tray and was large enough to warrant a tibial cone. A culture was then sent from the tibial surface. Extensive scar tissue was removed from both the medial and lateral gutters, as well as the anterior interval.   The tibia and femoral canals were then sequentially reamed until cortical contact was achieved. The femur achieved a diameter of 18 mm and tibia achieved a diameter of 15 mm. Using the intramedullary stems the proximal tibia was then recut to achieve a flush surface. A trial tibial tray size 5 achieved a good fit and was then reamed and broached for the keel.  The tibia was then reamed and broached for cone.      A tibial trial prosthesis was then assembled and a good fit was observed.    The epicondylar axis of the femur was marked and then a trial femur was placed. Using this as a guide, augments were then determined and cuts were made to allow a more flush apposition of the components with the bone. The intercondylar notch was then resected and the remnants of the PCL were removed. A trial prosthesis was then assembled with augments and then impacted into position. A good fit and rotation was observed. A trial polyethylene was then inserted.   The trial components were then placed and the knee was found to have proper alignment and was stable through a full range of flexion and extension.   Scar tissue was then removed from around the patellar component and it was stable.       The knee was then thoroughly washed using pulsatile irrigation. The components were then assembled on the back table prior to implantation. Cement was mixed and a hybrid cement technique was utilized for fixation. The trial components were removed and the bony surfaces were  cleaned with pulsatile lavage and an antibiotic solution. The bone was then dried and the permanent components were cemented in a sequential fashion. Cement was first placed on the proximal tibia and on the tibia and stem interface. The tibial component was then set in the proper position and rotation. The tibial component was impacted into place, it was fully seated and excess cemented was removed. The femoral component was then cemented in place. Excess cement was removed.     A mixture of 30cc of 0.5% ropivicaine, 10mg Morphine, 30mg toradol, 0.2mg of epinephrine were injected into the knee joint capsule posteriorly, in the medial and lateral gutters, and at the capsulotomy incision sites prior to closure for local anesthesia.     Once the cement was hardened a trial tibial articular surface was implanted. The knee was then reduced and found to have good flexion, full extension with good stability and proper alignment. The patella tracked central. The trial was removed and the final surface was implanted.     A dilute (0.35%) betadine lavage was placed in the arthrotomy site to soak the components for 3 minutes prior to wound closure. The solution was made by adding 17.5 ml of 10% povidone-iodine in 500 cc of normal saline, resulting in a 0.35% dilution. This solution was then removed from the knee and the knee was copiously irrigated.  The tourniquet was then released and hemostasis was obtained with electrocautery. The knee irrigated with pulsatile lavage and antibiotic solution followed by normal saline. A hemovac drain was inserted and brought through a separate incision on the anterolateral thigh.     The arthrotomy was closed with # 0 Ethibond interrupted sutures. The subcutaneous tissue was closed in layers with # 0 and # 3-0 Vicryl interrupted sutures. The skin was closed with staples. A sterile compressive dressing was applied and the drain activated with suction. The patient tolerated the procedure  well, without complication, and was transferred to the recovery room in a stable condition. The sponge and instrument count was correct.   A surgical first assistant was required and necessary for the completion of this case to aid in manipulation and positioning of the extremity while the surgeon operated.  Their assistance was vital to the safety and success of the procedure.     Deep venous thrombosis prophylaxis will consist of xarelto for 2 weeks followed by aspirin twice daily.

## 2023-03-20 NOTE — H&P
Patient ID: Frederick Cruz     Chief Complaint:    Painful right total knee     HPI:    Frederick Cruz is a 67 y.o. year old patient with failed arthroplasty of the right knee.  Conservative treatment has failed.  Here today for revision right total knee arthroplasty.    Past Medical History:   Diagnosis Date   • Arthritis    • Coronary artery disease    • Depression    • Hypertension    • Kidney disease, chronic, stage III (GFR 30-59 ml/min) ([facility])    • Sleep apnea    • Tachycardia    • Testosterone deficiency      Past Surgical History:   Procedure Laterality Date   • BACK SURGERY      x5   • COLONOSCOPY     • CORONARY ANGIOPLASTY WITH STENT PLACEMENT     • CYSTOSCOPY     • CYSTOSCOPY URETEROSCOPY LASER LITHOTRIPSY      with stent exchang   • CYSTOSCOPY W/ URETERAL STENT PLACEMENT     • ENDOSCOPY     • HAND SURGERY      right hand ulnar aneurysm   • HERNIA REPAIR     • KNEE ARTHROSCOPY Right    • KNEE JOINT MANIPULATION Left    • REPLACEMENT TOTAL KNEE BILATERAL     • SEPTOPLASTY     • VARICOSE VEIN SURGERY       Current Facility-Administered Medications   Medication Dose Route Frequency Provider Last Rate Last Admin   • ceFAZolin Sodium-Dextrose (ANCEF) IVPB (duplex) 2 g  2 g Intravenous Once Frederick Mukherjee MD       • ethyl alcohol 62 % 2 each  2 swab Nasal Once Frederick Mukherjee MD       • lactated ringers infusion  9 mL/hr Intravenous Continuous Evans Nguyen CRNA 9 mL/hr at 03/20/23 0915 9 mL/hr at 03/20/23 0915   • lidocaine PF 1% (XYLOCAINE) injection 0.5 mL  0.5 mL Injection Once PRN Evans Nguyen CRNA       • Midazolam HCl (PF) (VERSED) injection 1 mg  1 mg Intravenous Q5 Min PRN Evans Nguyen CRNA       • ropivacaine 0.5 % 30 mL, ketorolac 30 mg, EPINEPHrine 0.2 mg solution   Intra-articular Once Frederick Mukherjee MD       • sodium chloride 0.9 % flush 10 mL  10 mL Intravenous PRN Evans Nguyen CRNA       • sodium chloride 0.9 % flush 10 mL  10 mL Intravenous Q12H  Evans Nguyen CRNA       • sodium chloride 0.9 % infusion 40 mL  40 mL Intravenous PRN Evans Nguyen CRNA       • vancomycin (VANCOCIN) 1,500 mg in sodium chloride 0.9 % 500 mL IVPB  1,500 mg Intravenous Once Frederick Mukherjee MD         Social History     Tobacco Use   • Smoking status: Former     Types: Cigarettes   • Smokeless tobacco: Not on file   • Tobacco comments:     Quit 30 years ago   Substance Use Topics   • Alcohol use: Not on file     Comment: occasional     History reviewed. No pertinent family history.    Allergies   Allergen Reactions   • Demerol [Meperidine] Hallucinations     Immunization History   Administered Date(s) Administered   • COVID-19 (PFIZER) PURPLE CAP 03/31/2021, 04/23/2021   • Covid-19 (Pfizer) Gray Cap 01/24/2022     Vitals:    03/20/23 0847   BP: 127/82   Pulse: 97   Resp: 15   Temp: 97.7 °F (36.5 °C)   SpO2: 96%       ROS:    All other pertinent positives and negatives as noted above in HPI.    Physical Exam:     Alert at time of exam  Emotional Behavior - stable and appropriate  Gen- healthy appearing, in no acute distress  HEENT- Normocephalic, atraumatic  Extremity- no gross deformity, see office notes, no changes  Neuro- motor and sensory grossly intact, moving all extremities  Pulses- Present bilateral lower extremities, toes pink, BCR    Painful and restricted rom - Right knee      Diagnostic Studies:     Right knee arthroplasty components in place      Assessment:     Failed Right Knee TKA    Plan:      Revision Right TKA today      CONSENT TO PROCEDURE/SEDATION  *  Information provided regarding procedure: Yes   *  Risk/Benefits Discussed: Yes   *  Alternative /Recovery Discussed: Yes   *  Need for Blood Discussed: Yes   *  Patient /Parent Consents to Planned Procedure/Sedation and accurately recounts discussion: Yes     The spectrum of treatment options were discussed with the patient in detail including both the nonoperative and operative treatment  modalities and their respective risks and benefits.  After thorough discussion, the patient has elected to undergo surgical treatment.  The details of the surgical procedure were explained including the location of probable incisions and a description of the likely implants to be used.  Models and diagrams were used as educational resources. The patient understands the likely convalescence after surgery, as well as the rehabilitation required.  We thoroughly discussed the risks, benefits, and alternatives to surgery.  The risks include but are not limited to the risk of infection, joint stiffness, blood clots (including DVT and/or pulmonary embolus along with the risk of death), neurologic and/or vascular injury, fracture, dislocation, nonunion, malunion, need for further surgery including hardware failure requiring revision, and continued pain.  It was explained that if tissue has been repaired or reconstructed, there is also a chance of failure which may require further management.  In addition, we have discussed the risks, including the risk of disease transmission, associated with any allograft tissue which may be utilized.  Following the completion of the discussion, the patient expressed understanding of this planned course of care, all their questions were answered and consent will be obtained preoperatively.

## 2023-03-20 NOTE — ANESTHESIA PROCEDURE NOTES
Peripheral Block    Pre-sedation assessment completed: 3/20/2023 12:05 PM    Patient reassessed immediately prior to procedure    Patient location during procedure: pre-op  Start time: 3/20/2023 12:15 PM  Stop time: 3/20/2023 12:18 PM  Reason for block: at surgeon's request and post-op pain management  Performed by  CRNA/CAA: Corky Sullivan, JHONATAN: Tatiana Cartwright SRNA  Preanesthetic Checklist  Completed: patient identified, IV checked, site marked, risks and benefits discussed, surgical consent, monitors and equipment checked, pre-op evaluation and timeout performed  Prep:  Pt Position: supine  Sterile barriers:cap, gloves, mask and washed/disinfected hands  Prep: ChloraPrep  Patient monitoring: blood pressure monitoring, continuous pulse oximetry and EKG  Procedure    Sedation: yes  Performed under: local infiltration  Guidance:ultrasound guided    ULTRASOUND INTERPRETATION.  Using ultrasound guidance a 21 G gauge needle was placed in close proximity to the nerve, at which point, under ultrasound guidance anesthetic was injected in the area of the nerve and spread of the anesthesia was seen on ultrasound in close proximity thereto.  There were no abnormalities seen on ultrasound; a digital image was taken; and the patient tolerated the procedure with no complications. Images:still images obtained, printed/placed on chart    Laterality:right  Block Type:field (LEO)  Injection Technique:single-shot  Needle Type:echogenic  Needle Gauge:21 G  Resistance on Injection: none    Medications Used: bupivacaine PF (MARCAINE) injection 0.25% - Injection   10 mL - 3/20/2023 12:18:00 PM      Post Assessment  Injection Assessment: negative aspiration for heme, no paresthesia on injection and incremental injection  Patient Tolerance:comfortable throughout block  Complications:no

## 2023-03-20 NOTE — ANESTHESIA PROCEDURE NOTES
Peripheral Block    Pre-sedation assessment completed: 3/20/2023 12:05 PM    Patient reassessed immediately prior to procedure    Patient location during procedure: pre-op  Start time: 3/20/2023 12:12 PM  Stop time: 3/20/2023 12:14 PM  Reason for block: at surgeon's request and post-op pain management  Performed by  CRNA/CAA: Corky Sullivan, JHONATAN: Tatiana Cartwright SRNA  Preanesthetic Checklist  Completed: patient identified, IV checked, site marked, risks and benefits discussed, surgical consent, monitors and equipment checked, pre-op evaluation and timeout performed  Prep:  Pt Position: supine  Sterile barriers:cap, gloves, mask and washed/disinfected hands  Prep: ChloraPrep  Patient monitoring: blood pressure monitoring, continuous pulse oximetry and EKG  Procedure    Sedation: yes  Performed under: local infiltration  Guidance:ultrasound guided    ULTRASOUND INTERPRETATION.  Using ultrasound guidance a 21 G gauge needle was placed in close proximity to the nerve, at which point, under ultrasound guidance anesthetic was injected in the area of the nerve and spread of the anesthesia was seen on ultrasound in close proximity thereto.  There were no abnormalities seen on ultrasound; a digital image was taken; and the patient tolerated the procedure with no complications. Images:still images obtained, printed/placed on chart    Laterality:right  Block Type:iPack  Injection Technique:single-shot  Needle Type:echogenic  Needle Gauge:21 G  Resistance on Injection: none    Medications Used: bupivacaine PF (MARCAINE) injection 0.25% - Injection   20 mL - 3/20/2023 12:14:00 PM      Post Assessment  Injection Assessment: negative aspiration for heme, no paresthesia on injection and incremental injection  Patient Tolerance:comfortable throughout block  Complications:no

## 2023-03-20 NOTE — ANESTHESIA POSTPROCEDURE EVALUATION
Patient: Frederick Cruz    Procedure Summary     Date: 03/20/23 Room / Location:  [facility] OR 3 /  [facility]    Anesthesia Start: 1227 Anesthesia Stop: 1536    Procedure: RIGHT TOTAL KNEE REVISION (Right: Knee) Diagnosis: (T84.84XA)    Surgeons: Frederick Mukherjee MD Provider: Evans Nguyen CRNA    Anesthesia Type: general with block ASA Status: 2          Anesthesia Type: general with block    Vitals  Vitals Value Taken Time   /90 03/20/23 1645   Temp 97.9 °F (36.6 °C) 03/20/23 1640   Pulse 115 03/20/23 1647   Resp 15 03/20/23 1640   SpO2 94 % 03/20/23 1647   Vitals shown include unvalidated device data.        Post Anesthesia Care and Evaluation    Patient location during evaluation: bedside  Patient participation: complete - patient participated  Level of consciousness: awake and alert  Pain score: 2  Pain management: adequate    Airway patency: patent  Anesthetic complications: No anesthetic complications  PONV Status: none  Cardiovascular status: acceptable  Respiratory status: acceptable  Hydration status: acceptable

## 2023-03-20 NOTE — NURSING NOTE
Bedside handoff given to Martha YEN. Vital signs stable upon transfer to floor. Patient denies pain at this time. No questions or complaints at this time.

## 2023-03-20 NOTE — CONSULTS
[facility]     Nathan Mejia MD    Reason for Consultation: Medical Management of Blood Pressure; Sleep Apnea    HISTORY OF PRESENT ILLNESS:    Mr. Cruz is a pleasant, 68 y/o  male who underwent his 2nd right knee revision today.  He reports a history of continued joint instability and pain severely limiting his activities and his ability to work.  He also reports several falls due to the knee giving out.  He denies chest pain and dyspnea w/ activity.  He just received his new CPAP device through the mail.  Pain and joint instability exacerbated by activity and relieved w/ rest.  Some relief w/ OTC remedies.        Past Medical History:   Diagnosis Date   • Arthritis    • Coronary artery disease    • Depression    • Hypertension    • Kidney disease, chronic, stage III (GFR 30-59 ml/min) ([facility])    • Sleep apnea    • Tachycardia    • Testosterone deficiency      Past Surgical History:   Procedure Laterality Date   • BACK SURGERY      x5   • COLONOSCOPY     • CORONARY ANGIOPLASTY WITH STENT PLACEMENT     • CYSTOSCOPY     • CYSTOSCOPY URETEROSCOPY LASER LITHOTRIPSY      with stent exchang   • CYSTOSCOPY W/ URETERAL STENT PLACEMENT     • ENDOSCOPY     • HAND SURGERY      right hand ulnar aneurysm   • HERNIA REPAIR     • KNEE ARTHROSCOPY Right    • KNEE JOINT MANIPULATION Left    • REPLACEMENT TOTAL KNEE BILATERAL     • SEPTOPLASTY     • VARICOSE VEIN SURGERY       History reviewed. No pertinent family history.  Social History     Tobacco Use   • Smoking status: Former     Types: Cigarettes     Passive exposure: Past   • Smokeless tobacco: Never   • Tobacco comments:     Quit 30 years ago   Vaping Use   • Vaping Use: Never used   Substance Use Topics   • Alcohol use: Yes     Comment: occasional   • Drug use: Never     Medications Prior to Admission   Medication Sig Dispense Refill Last Dose   • amphetamine-dextroamphetamine (ADDERALL) 20 MG tablet Take 1 tablet by mouth 3  "(Three) Times a Day.   3/19/2023   • cetirizine (zyrTEC) 10 MG tablet Take 1 tablet by mouth Daily.   3/19/2023   • lisinopril (PRINIVIL,ZESTRIL) 5 MG tablet Take 1 tablet by mouth Daily.   3/19/2023   • multivitamin with minerals tablet tablet Take 1 tablet by mouth Daily.   3/19/2023   • pregabalin (LYRICA) 300 MG capsule TAKE 1 CAPSULE BY MOUTH 2 (TWO) TIMES DAILY. MAX DAILY AMOUNT: 600 MG   3/19/2023   • aspirin 81 MG chewable tablet Chew 1 tablet Daily.   3/13/2023   • carvedilol (COREG) 12.5 MG tablet Take 1 tablet by mouth Every 12 (Twelve) Hours.   3/13/2023   • Testosterone Enanthate 200 MG/ML solution testosterone enanthate 200 mg/mL intramuscular oil   INJECT 0.8ML INTRAMUSCULARLY EVERY 7 DAYS. *ONLY CYPIONATE COVERED, PA REQUIRED FOR ETNANTHATE*   3/12/2023     Allergies:  Demerol [meperidine]    REVIEW OF SYSTEMS:  Please see the above history of present illness for pertinent positives and negatives.  The remainder of the patient's systems have been reviewed and are negative.    Vital Signs  Temp:  [97.6 °F (36.4 °C)-98.6 °F (37 °C)] 97.6 °F (36.4 °C)  Heart Rate:  [] 113  Resp:  [10-25] 20  BP: (114-140)/() 128/81  Oxygen Therapy  SpO2: 90 %  Pulse Oximetry Type: Continuous  Device (Oxygen Therapy): nasal cannula with ETCO2  Flow (L/min): 3  Oxygen Concentration (%): 3  ETCO2 (mmHg): 36 mmHg}  Body mass index is 31.28 kg/m².     Flowsheet Rows    Flowsheet Row First Filed Value   Admission Height 177.8 cm (70\") Documented at 03/20/2023 1718   Admission Weight 99.2 kg (218 lb 9.6 oz) Documented at 03/20/2023 0847             Physical Exam:  Physical Exam   Constitutional: Patient appears well developed and well nourished and in no acute distress   HEENT:   Head: Normocephalic and atraumatic.   Eyes:  Pupils are equal, round, and reactive to light. EOM are intact. Sclerae are anicteric and noninjected.  Cardiovascular: Regular rate, regular rhythm, S1 normal and S2 normal.  Exam reveals no " gallop and no friction rub.  No murmur heard.  Radial and pedal pulses are 2+ and symmetric.  Pulmonary/Chest: Lungs are clear to auscultation bilaterally. No respiratory distress. No wheezes. No rhonchi. No rales.   Abdominal: Obese. Soft. Bowel sounds are normal. There is no tenderness.   Musculoskeletal: Normal muscle tone  Neurological: Cranial nerves II-XII are grossly intact with no focal deficits.  Skin: Skin is warm. No rash noted. Nails show no clubbing.  No cyanosis or erythema.       Results Review:    I reviewed the patient's new clinical results.  Lab Results (most recent)     Procedure Component Value Units Date/Time    Tissue / Bone Culture - Tissue, Knee, Right [[id number]] Collected: 03/20/23 1305    Specimen: Tissue from Knee, Right Updated: 03/20/23 1805     Gram Stain Rare (1+) WBCs seen      No organisms seen    Tissue / Bone Culture - Tissue, Knee, Right [[id number]] Collected: 03/20/23 1305    Specimen: Tissue from Knee, Right Updated: 03/20/23 1803     Gram Stain Rare (1+) WBCs seen      No organisms seen    Anaerobic Culture - Tissue, Knee, Right [[id number]] Collected: 03/20/23 1305    Specimen: Tissue from Knee, Right Updated: 03/20/23 1356    Anaerobic Culture - Tissue, Knee, Right [[id number]] Collected: 03/20/23 1305    Specimen: Tissue from Knee, Right Updated: 03/20/23 1356          Imaging Results (Most Recent)     Procedure Component Value Units Date/Time    XR Knee 1 or 2 View Right [[id number]] Collected: 03/20/23 1620     Updated: 03/20/23 1622    Narrative:      CR Knee 1 or 2 Vws RT    INDICATION:   Status post right knee replacement.    COMPARISON:   None available.    FINDINGS:  AP and lateral view(s) of the right knee.  Status post total right knee arthroplasty. Surgical hardware intact. Expected postoperative changes. There is a radiopaque drain present. No bone erosion or destruction.  No foreign body.      Impression:        1. Status post total right knee  arthroplasty.    Signer Name: César Beyer MD   Signed: 3/20/2023 4:20 PM   Workstation Name: [id number]    [facility] [employer] Port[employer] [[id number]] Resulted: 03/20/23 0847     Updated: 03/20/23 0847    Narrative:      This procedure was auto-finalized with no dictation required.        reviewed    ECG/EMG Results (most recent)     None          Impressions/Recommendations:    1.  Essential Hypertension: BP at goal on exam.  Continue to monitor trend on current regimen.    2.  DENNIS: May use home device if able.    3.  Lumbar Stenosis w/ Neurogenic Claudication: No acute issues.    I discussed the patient's findings and my recommendations with Mr. Cruz.     Aashish Morris MD  03/20/23  18:09 EDT

## 2023-03-20 NOTE — ANESTHESIA PROCEDURE NOTES
Airway  Urgency: elective    Date/Time: 3/20/2023 12:34 PM  Airway not difficult    General Information and Staff    Patient location during procedure: OR  CRNA/CAA: Evans Nguyen CRNA  SRNA: Sindy Andrade SRNA  Indications and Patient Condition  Indications for airway management: airway protection    Preoxygenated: yes  Mask difficulty assessment: 0 - not attempted    Final Airway Details  Final airway type: supraglottic airway      Successful airway: unique  Size 4     Number of attempts at approach: 1  Assessment: lips, teeth, and gum same as pre-op

## 2023-03-20 NOTE — ANESTHESIA PREPROCEDURE EVALUATION
Anesthesia Evaluation     Patient summary reviewed and Nursing notes reviewed   no history of anesthetic complications:  NPO Solid Status: > 8 hours  NPO Liquid Status: > 4 hours           Airway   Mallampati: III  TM distance: >3 FB  Neck ROM: full  No difficulty expected  Dental - normal exam     Pulmonary - normal exam    breath sounds clear to auscultation  (+) a smoker Former, sleep apnea on CPAP,   Cardiovascular - normal exam  Exercise tolerance: good (4-7 METS)    ECG reviewed  Patient on routine beta blocker and Beta blocker given within 24 hours of surgery  Rhythm: regular  Rate: normal    (+) hypertension, past MI (2014)  >12 months, CAD, cardiac stents     ROS comment: EKG 3/6/23:  - BORDERLINE ECG -  Sinus tachycardia  Probable left atrial enlargement  Left axis deviation  NO PRIOR TRACING AVAILABLE FOR COMPARISON    eCHO 7/29/20:    CONCLUSIONS     Left ventricular cavity size normal.       Left ventricular wall thickness mildly increased.       Normal left ventricular systolic function.       No obvious regional wall motion abnormalities.       Right ventricular dilatation. Normal right ventricular global systolic function.       No significant valve disease     Neuro/Psych  (+) psychiatric history Depression,    GI/Hepatic/Renal/Endo    (-) no renal disease    Musculoskeletal         ROS comment: L1-S1 Fusion 2022  Left knee pain  Abdominal  - normal exam   Substance History   (+) alcohol use (Couple drinks per month),      OB/GYN          Other   arthritis,                      Anesthesia Plan    ASA 2     general with block     intravenous induction     Anesthetic plan, risks, benefits, and alternatives have been provided, discussed and informed consent has been obtained with: patient.    Use of blood products discussed with patient  Consented to blood products.       CODE STATUS:

## 2023-03-21 VITALS
DIASTOLIC BLOOD PRESSURE: 74 MMHG | HEART RATE: 97 BPM | BODY MASS INDEX: 31.21 KG/M2 | SYSTOLIC BLOOD PRESSURE: 120 MMHG | RESPIRATION RATE: 18 BRPM | HEIGHT: 70 IN | TEMPERATURE: 96.7 F | WEIGHT: 218 LBS | OXYGEN SATURATION: 94 %

## 2023-03-21 PROBLEM — T84.84XA PAIN IN PROSTHETIC JOINT: Status: ACTIVE | Noted: 2023-03-21

## 2023-03-21 PROCEDURE — 97161 PT EVAL LOW COMPLEX 20 MIN: CPT

## 2023-03-21 PROCEDURE — 99231 SBSQ HOSP IP/OBS SF/LOW 25: CPT | Performed by: HOSPITALIST

## 2023-03-21 PROCEDURE — 0 CEFAZOLIN SODIUM-DEXTROSE 2-3 GM-%(50ML) RECONSTITUTED SOLUTION: Performed by: ORTHOPAEDIC SURGERY

## 2023-03-21 RX ADMIN — RIVAROXABAN 10 MG: 20 TABLET, FILM COATED ORAL at 08:38

## 2023-03-21 RX ADMIN — OXYCODONE HYDROCHLORIDE 10 MG: 5 TABLET ORAL at 12:52

## 2023-03-21 RX ADMIN — PREGABALIN 300 MG: 75 CAPSULE ORAL at 10:32

## 2023-03-21 RX ADMIN — CETIRIZINE HYDROCHLORIDE 10 MG: 10 TABLET, FILM COATED ORAL at 08:38

## 2023-03-21 RX ADMIN — OXYCODONE HYDROCHLORIDE 10 MG: 5 TABLET ORAL at 08:37

## 2023-03-21 RX ADMIN — FAMOTIDINE 40 MG: 20 TABLET, FILM COATED ORAL at 08:38

## 2023-03-21 RX ADMIN — OXYCODONE HYDROCHLORIDE 10 MG: 5 TABLET ORAL at 04:28

## 2023-03-21 RX ADMIN — CEFAZOLIN SODIUM 2 G: 2 SOLUTION INTRAVENOUS at 04:29

## 2023-03-21 RX ADMIN — LISINOPRIL 5 MG: 10 TABLET ORAL at 08:37

## 2023-03-21 RX ADMIN — CARVEDILOL 12.5 MG: 6.25 TABLET, FILM COATED ORAL at 08:38

## 2023-03-21 NOTE — NURSING NOTE
Hemovac emptied several times prior to discharge. JULIAN Hall made aware and he instructed to leave drain in and  have patient call office on POD#3. Note made on AVS instructing to call office for drain care. Pt and family verb understanding all care and all answered questions.

## 2023-03-21 NOTE — PROGRESS NOTES
Patient: Frederick Cruz  Procedure(s):  RIGHT TOTAL KNEE REVISION  Anesthesia type: general with block    Patient location: [facility] Surgical Floor  Vitals:    03/21/23 0524 03/21/23 0617 03/21/23 0618 03/21/23 0837   BP: 120/74      BP Location: Left arm      Patient Position: Lying      Pulse: 102   97   Resp: 18      Temp: 96.7 °F (35.9 °C)      TempSrc: Axillary      SpO2: 94% (!) 86% 94%    Weight:       Height:         Level of consciousness: awake, alert and oriented    Post-anesthesia pain: adequate analgesia  Airway patency: patent  Respiratory: unassisted  Cardiovascular: stable and blood pressure at baseline  Hydration: euvolemic    Anesthetic complications: no

## 2023-03-21 NOTE — PROGRESS NOTES
"Progress Note    Patient: Frederick Cruz  Medical record #: [id number]    Frederick Cruz is a 67 y.o.  male who is POD #1 Revision Right  TKA.  The patient's main complaint today is surgical pain at the operative site. He has been up with PT and has progressed.       Hospital Problem List:    Failed total knee, right, sequela    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)      Current Medications:  Scheduled Meds:carvedilol, 12.5 mg, Oral, Q12H  cetirizine, 10 mg, Oral, Daily  famotidine, 40 mg, Oral, Daily  lisinopril, 5 mg, Oral, Daily  pregabalin, 300 mg, Oral, Q12H  rivaroxaban, 10 mg, Oral, Daily      Continuous Infusions:lactated ringers, 9 mL/hr, Last Rate: Stopped (03/20/23 1533)  lactated ringers, 100 mL/hr, Last Rate: Stopped (03/20/23 1755)  sodium chloride 0.9 % with KCl 20 mEq, 100 mL/hr, Last Rate: Stopped (03/20/23 1756)      PRN Meds:.•  acetaminophen  •  HYDROmorphone **AND** naloxone  •  melatonin  •  Morphine **AND** naloxone  •  ondansetron **OR** ondansetron  •  oxyCODONE  •  oxyCODONE-acetaminophen  •  traMADol    Input/Output:    Intake/Output Summary (Last 24 hours) at 3/21/2023 1300  Last data filed at 3/21/2023 1256  Gross per 24 hour   Intake 1600 ml   Output 1660 ml   Net -60 ml       Vital signs:  Blood pressure 120/74, pulse 97, temperature 96.7 °F (35.9 °C), temperature source Axillary, resp. rate 18, height 177.8 cm (70\"), weight 98.9 kg (218 lb), SpO2 94 %.    Physical Exam:   Right  Knee:  Dressing: clean and dry  Able to dorsiflex ankle and move toes  Sensation grossly intact to light touch throughout  Distal pulses intact  No calf swelling  Gisell's sign negative  Compartments soft  No signs or symptoms of DVT or compartment syndrome      Labs:   [unfilled]    Assessment: 67 y.o.  male POD #1 Right Revision  TKA    Plan:  Hgb - stable  drain out on POD #1 if less than 150cc of drainage. If greater than 150 in 24 hour then leave drain in place and have patient call office on POD #3   mobilize with " PT, WBAT on operative knee with walker  pain controlled with meds  Xarelto for DVT prophylaxis for 2 weeks, then transition to ECASA 81 mg BID for 4 weeks    Leave dressing in place for one week once aquacel on    Disposition: plan  discharge home vs rehab on POD #1  if doing well with PT    Follow up with Dr. Mukherjee- 2-3 weeks after discharge home or 2-3 weeks after discharge from rehab    [facility] medical conditions to include   Failed total knee, right, sequela    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)        Signed:  TEZ Olson  3/21/2023  13:00 EDT

## 2023-03-21 NOTE — PROGRESS NOTES
"Hospitalist Team      Patient Care Team:  Nathan Mejia MD as PCP - General (Family Medicine)      Chief Complaint: Follow-up Hypertension    Subjective    Doing well this morning.  He is quite happy he knee is no longer \"wobbly\".  He denies chest pain and dyspnea.  He is tolerating PO.    Objective    Vital Signs  Temp:  [96.7 °F (35.9 °C)-98.6 °F (37 °C)] 96.7 °F (35.9 °C)  Heart Rate:  [] 97  Resp:  [10-25] 18  BP: (112-140)/() 120/74  Oxygen Therapy  SpO2: 94 %  Pulse Oximetry Type: Continuous  Device (Oxygen Therapy): room air  Flow (L/min): 2  Oxygen Concentration (%): 3  ETCO2 (mmHg): 36 mmHg}    Flowsheet Rows    Flowsheet Row First Filed Value   Admission Height 177.8 cm (70\") Documented at 03/20/2023 1718   Admission Weight 99.2 kg (218 lb 9.6 oz) Documented at 03/20/2023 0847          Physical Exam:    General: Appears stated age in no acute distress.  Lungs: Breath sounds are clear throughout all fields.  Respirations are non-labored.  CV: Regular rate and rhythm.  No murmurs   Abdomen: Obese, soft, and non-tender w/ active bowel sounds.  MSK: No clubbing or cyanosis.  Neuro: CN II-XII grossly intact.  Psych: Pleasant affect.  AAOx3.    Results Review:     I reviewed the patient's new clinical results.    Lab Results (last 24 hours)     Procedure Component Value Units Date/Time    Tissue / Bone Culture - Tissue, Knee, Right [[id number]] Collected: 03/20/23 1305    Specimen: Tissue from Knee, Right Updated: 03/20/23 1805     Gram Stain Rare (1+) WBCs seen      No organisms seen    Tissue / Bone Culture - Tissue, Knee, Right [[id number]] Collected: 03/20/23 1305    Specimen: Tissue from Knee, Right Updated: 03/20/23 1803     Gram Stain Rare (1+) WBCs seen      No organisms seen    Tissue / Bone Culture - Synovium, Knee, Right [[id number]] Collected: 03/20/23 1305    Specimen: Synovium from Knee, Right Updated: 03/20/23 1800     Gram Stain Rare (1+) WBCs seen      No organisms seen    " Anaerobic Culture - Tissue, Knee, Right [[id number]] Collected: 03/20/23 1305    Specimen: Tissue from Knee, Right Updated: 03/20/23 1356    Anaerobic Culture - Tissue, Knee, Right [[id number]] Collected: 03/20/23 1305    Specimen: Tissue from Knee, Right Updated: 03/20/23 1356    Anaerobic Culture - Synovium, Knee, Right [[id number]] Collected: 03/20/23 1305    Specimen: Synovium from Knee, Right Updated: 03/20/23 1356          Imaging Results (Last 24 Hours)     Procedure Component Value Units Date/Time    XR Knee 1 or 2 View Right [[id number]] Collected: 03/20/23 1620     Updated: 03/20/23 1622    Narrative:      CR Knee 1 or 2 Vws RT    INDICATION:   Status post right knee replacement.    COMPARISON:   None available.    FINDINGS:  AP and lateral view(s) of the right knee.  Status post total right knee arthroplasty. Surgical hardware intact. Expected postoperative changes. There is a radiopaque drain present. No bone erosion or destruction.  No foreign body.      Impression:        1. Status post total right knee arthroplasty.    Signer Name: César Beyer MD   Signed: 3/20/2023 4:20 PM   Workstation Name: [id number]    [facility]          Medication Review:   I have reviewed the patient's current medication list    Current Facility-Administered Medications:   •  acetaminophen (TYLENOL) tablet 325 mg, 325 mg, Oral, Q4H PRN, Frederick Mukherjee MD  •  carvedilol (COREG) tablet 12.5 mg, 12.5 mg, Oral, Q12H, Frederick Mukherjee MD, 12.5 mg at 03/21/23 0838  •  cetirizine (zyrTEC) tablet 10 mg, 10 mg, Oral, Daily, Frederick Mukherjee MD, 10 mg at 03/21/23 0838  •  famotidine (PEPCID) tablet 40 mg, 40 mg, Oral, Daily, Frederick Mukherjee MD, 40 mg at 03/21/23 0838  •  HYDROmorphone (DILAUDID) injection 0.5 mg, 0.5 mg, Intravenous, Q2H PRN **AND** naloxone (NARCAN) injection 0.1 mg, 0.1 mg, Intravenous, Q5 Min PRN, Frederick Mukherjee MD  •  lactated ringers infusion, 9 mL/hr,  Intravenous, Continuous, Evans Nguyen CRNA, Stopped at 03/20/23 1533  •  lactated ringers infusion, 100 mL/hr, Intravenous, Continuous, Evans Nguyen CRNA, Stopped at 03/20/23 1755  •  lisinopril (PRINIVIL,ZESTRIL) tablet 5 mg, 5 mg, Oral, Daily, Frederick Mukherjee MD, 5 mg at 03/21/23 0837  •  melatonin tablet 5 mg, 5 mg, Oral, Nightly PRN, Frederick Mukherjee MD  •  morphine injection 4 mg, 4 mg, Intravenous, Q2H PRN **AND** naloxone (NARCAN) injection 0.4 mg, 0.4 mg, Intravenous, Q5 Min PRN, Frederick Mukherjee MD  •  ondansetron (ZOFRAN) tablet 4 mg, 4 mg, Oral, Q6H PRN **OR** ondansetron (ZOFRAN) injection 4 mg, 4 mg, Intravenous, Q6H PRN, Frederick Mukherjee MD  •  oxyCODONE (ROXICODONE) immediate release tablet 10 mg, 10 mg, Oral, Q4H PRN, Frederick Mukherjee MD, 10 mg at 03/21/23 0837  •  oxyCODONE-acetaminophen (PERCOCET) 7.5-325 MG per tablet 1 tablet, 1 tablet, Oral, Q4H PRN, Frederick Mukherjee MD  •  pregabalin (LYRICA) capsule 300 mg, 300 mg, Oral, Q12H, Frederick Mukherjee MD, 300 mg at 03/20/23 2011  •  rivaroxaban (XARELTO) tablet 10 mg, 10 mg, Oral, Daily, Frederick Mukherjee MD, 10 mg at 03/21/23 0838  •  sodium chloride 0.9 % with KCl 20 mEq/L infusion, 100 mL/hr, Intravenous, Continuous, Frederick Mukherjee MD, Stopped at 03/20/23 1756  •  traMADol (ULTRAM) tablet 50 mg, 50 mg, Oral, Q8H PRN, Frederick Mukherjee MD      Impressions/Recommendations:    1.  Essential Hypertension: BP remains at goal.  Continue his home regimen.    2.  DENNIS: May use home device, but wife has not brought it in.    3.  Lumbar Stenosis w/ Neurogenic Claudication: No acute issues.    Plan for disposition: As per primary.  We will sign off.    Aashish Morris MD  03/21/23  08:49 EDT

## 2023-03-21 NOTE — PLAN OF CARE
"Goal Outcome Evaluation:  Plan of Care Reviewed With: patient        Progress: improving  Outcome Evaluation: Pt ambulated in room. He was very \"excited about my knee not being wobbly.\" Pain meds and antibiotics given. Tolerating diet and using urinal at this time. Hemovac emptied 1 x with bloody drainage.  "

## 2023-03-21 NOTE — CASE MANAGEMENT/SOCIAL WORK
Discharge Planning Assessment   Nica Patton     Patient Name: Frederick Cruz  MRN: [id number]  Today's Date: 3/21/2023    Admit Date: 3/20/2023    Plan: Home with wife and [facility]    Discharge Needs Assessment    No documentation.                Discharge Plan     Row Name 03/21/23 1305       Plan    Plan Home with wife and [facility]     Patient/Family in Agreement with Plan yes    Plan Comments Into room and introduced self and role of CM. Discussed discharge disposition with patient at bedside. Patient states he has just finished working with PT and he did so well he now plans on going home with home health for PT. He wants to call his wife and discuss this with her and called her on his cell phone with CM present and discussed that he did well and PT recommendations would be home with home health or straight to OP PT. After discussion patient and wife are agreeable for 2 weeks of home health for PT and then OP at [facility] facility [facility]. Patient confirms that the info on his face sheet is correct and that he see's Dr. Nathan Mejia as PCP. He states he uses [facility] in [place], [place] and currently has no problem picking up or paying for his med's. He also states that he does not have a living will and declines information regarding one. Patient states he lives with his wife in a single story house with one step and handrail to gain entry. He states he normally has no issues entering the home to maneuvering inside. He states he is independent with  his ADL's works and drove prior to his surgery and his wife will be able to provide ride home at discharge. He also states that he has a rolling walker, quad cane and BSC at  home and he is having to but a new shower chair because his broke prior to his surgery. Patient states he plans on returning home at discharge with his wife to help as needed and [facility]  for PT. Patient had no other questions or concerns regarding discharge needs or  plans at this time. Name and number placed on white board in room. CM will follow.              Continued Care and Services - Admitted Since 3/20/2023     Home Medical Care     Service Provider Request Status Selected Services Address Phone Fax Patient Preferred    [facility] [facility]ending - Request Sent N/A [id number], [address], [facility] [id number] [phone] --              Expected Discharge Date and Time     Expected Discharge Date Expected Discharge Time    Mar 21, 2023          Demographic Summary     Row Name 03/21/23 1259       General Information    Admission Type inpatient    Arrived From home    Required Notices Provided Important Message from Medicare    Referral Source admission list    Reason for Consult discharge planning       Contact Information    Permission Granted to Share Info With                Functional Status    No documentation.                Psychosocial    No documentation.                Abuse/Neglect    No documentation.                Legal    No documentation.                Substance Abuse    No documentation.                Patient Forms    No documentation.                   Melyssa Kauffman RN

## 2023-03-21 NOTE — THERAPY DISCHARGE NOTE
Patient Name: Frederick Cruz  : 1955    MRN: [id number]                              Today's Date: 3/21/2023       Admit Date: 3/20/2023    Visit Dx:     ICD-10-CM ICD-9-CM   1. 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)     Past Medical History:   Diagnosis Date   • Arthritis    • Coronary artery disease    • Depression    • Hypertension    • Kidney disease, chronic, stage III (GFR 30-59 ml/min) ([facility])    • Sleep apnea    • Tachycardia    • Testosterone deficiency      Past Surgical History:   Procedure Laterality Date   • BACK SURGERY      x5   • COLONOSCOPY     • CORONARY ANGIOPLASTY WITH STENT PLACEMENT     • CYSTOSCOPY     • CYSTOSCOPY URETEROSCOPY LASER LITHOTRIPSY      with stent exchang   • CYSTOSCOPY W/ URETERAL STENT PLACEMENT     • ENDOSCOPY     • HAND SURGERY      right hand ulnar aneurysm   • HERNIA REPAIR     • KNEE ARTHROSCOPY Right    • KNEE JOINT MANIPULATION Left    • REPLACEMENT TOTAL KNEE BILATERAL     • SEPTOPLASTY     • TOTAL KNEE ARTHROPLASTY REVISION Right 3/20/2023    Procedure: RIGHT TOTAL KNEE REVISION;  Surgeon: Frederick Mukherjee MD;  Location: [facility];  Service: Orthopedics;  Laterality: Right;   • VARICOSE VEIN SURGERY        General Information     Row Name 23 1018          Physical Therapy Time and Intention    Document Type discharge evaluation/summary  -BP     Mode of Treatment physical therapy  -BP     Row Name 23 1018          General Information    Patient Profile Reviewed yes  -BP     Prior Level of Function independent:;all household mobility;community mobility;gait;transfer;ADL's;bed mobility;work  -BP     Existing Precautions/Restrictions fall  -BP     Barriers to Rehab none identified  -BP     Row Name 23 1018          Living Environment    People in Home spouse  -BP     Row Name 23 1018          Home Main Entrance     Number of Stairs, Main Entrance one  curb stair  -BP     Row Name 03/21/23 1018          Stairs Within Home, Primary    Stairs, Within Home, Primary one story  -BP     Row Name 03/21/23 1018          Cognition    Orientation Status (Cognition) oriented x 4  -BP     Row Name 03/21/23 1018          Safety Issues, Functional Mobility    Comment, Safety Issues/Impairments (Mobility) WFL  -BP           User Key  (r) = Recorded By, (t) = Taken By, (c) = Cosigned By    Initials Name Provider Type    Riki Matthew PT Physical Therapist               Mobility     Row Name 03/21/23 1019          Bed Mobility    Comment, (Bed Mobility) deferred, patient up in chair  -BP     Row Name 03/21/23 1019          Transfers    Comment, (Transfers) verbal cues for hand placement  -BP     Row Name 03/21/23 1019          Sit-Stand Transfer    Sit-Stand Ramsey (Transfers) supervision  -BP     Assistive Device (Sit-Stand Transfers) walker, front-wheeled  -BP     Row Name 03/21/23 1019          Gait/Stairs (Locomotion)    Ramsey Level (Gait) supervision  -BP     Assistive Device (Gait) walker, front-wheeled  -BP     Distance in Feet (Gait) 320, 100 with one seated break.  -BP     Deviations/Abnormal Patterns (Gait) gait speed decreased  -BP     Bilateral Gait Deviations forward flexed posture  -BP     Ramsey Level (Stairs) verbal cues  -BP     Assistive Device (Stairs) walker, front-wheeled  -BP     Number of Steps (Stairs) 2 curb stairs  -BP     Comment, (Gait/Stairs) Patient manages device safely without need for cues. No loss of balance noted. Patient manages two curb stairs with use of FWW with CGA. No assist to manage stairs or gait  -BP           User Key  (r) = Recorded By, (t) = Taken By, (c) = Cosigned By    Initials Name Provider Type    Riki Matthew PT Physical Therapist               Obj/Interventions     Row Name 03/21/23 1127          Range of Motion Comprehensive    Comment, General Range of  Motion L LE AROM WFL.  R ankle AROM WFL. R hip AROM WFL. R knee not formerly assessed however observed to be approximately 90 degrees in sitting and with heel slides.  -     Row Name 03/21/23 1127          Strength Comprehensive (MMT)    Comment, General Manual Muscle Testing (MMT) Assessment L E strength functional. R LE strength not formerly assessed however able to complete 10 reps SLR's and LAQ's independently without extension lag.  -     Row Name 03/21/23 1127          Balance    Comment, Balance sitting balance-independent. Standing balance-supervision with device.  -     Row Name 03/21/23 1127          Sensory Assessment (Somatosensory)    Sensory Assessment (Somatosensory) sensation intact  -           User Key  (r) = Recorded By, (t) = Taken By, (c) = Cosigned By    Initials Name Provider Type    BP Riki Castro, PT Physical Therapist               Goals/Plan    No documentation.                Clinical Impression     Row Name 03/21/23 1137          Pain    Pretreatment Pain Rating 4/10  -BP     Posttreatment Pain Rating 4/10  -BP     Pain Location - Side/Orientation Right  -     Pain Location incisional  -     Pain Location - knee  -     Pre/Posttreatment Pain Comment Pre medicated for treatment  -     Pain Intervention(s) Cold applied;Repositioned;Ambulation/increased activity  -     Additional Documentation Pain Scale: Numbers Pre/Post-Treatment (Group)  -[facility] Name 03/21/23 1137          Plan of Care Review    Plan of Care Reviewed With patient  -BP     Outcome Evaluation PT Evaluation Complete: Patient performs sit to/from stand transfers with supervision and gait x 420 feet with one seated break with supervision with use of FWW. Patient ascends/descends 2 curb stairs with FWW with CGA. Patient manages device safely without need for cues. No knee buckling or loss of balance noted. Patient performs L TKA HEP x 10 reps, provided and reviewed written handout.  Patient performs  10 SLR's independently without extension lag. Patient initially expressed interest in STR however discussed patients current functional performance and recommendation of discharge home with home health. Patient receptive and agreeable to discharge home with home health. No further inpatient skilled PT needs at this time. Anticipate discharge home today.  -BP     Row Name 03/21/23 1137          Therapy Assessment/Plan (PT)    Criteria for Skilled Interventions Met (PT) no problems identified which require skilled intervention  -BP     Therapy Frequency (PT) evaluation only  -BP     Row Name 03/21/23 1137          Positioning and Restraints    Pre-Treatment Position sitting in chair/recliner  -BP     Post Treatment Position chair  -BP     In Chair reclined;notified nsg;call light within reach;encouraged to call for assist  -BP           User Key  (r) = Recorded By, (t) = Taken By, (c) = Cosigned By    Initials Name Provider Type    Riki Matthew, PT Physical Therapist               Outcome Measures     Row Name 03/21/23 1249 03/21/23 0828       How much help from another person do you currently need...    Turning from your back to your side while in flat bed without using bedrails? 4  -BP 4  -TC    Moving from lying on back to sitting on the side of a flat bed without bedrails? 4  -BP 4  -TC    Moving to and from a bed to a chair (including a wheelchair)? 3  -BP 3  -TC    Standing up from a chair using your arms (e.g., wheelchair, bedside chair)? 3  -BP 4  -TC    Climbing 3-5 steps with a railing? 3  -BP 3  -TC    To walk in hospital room? 3  -BP 3  -TC    AM-PAC 6 Clicks Score (PT) 20  -BP 21  -TC    Highest level of mobility 6 --> Walked 10 steps or more  -BP 6 --> Walked 10 steps or more  -TC    Row Name 03/21/23 1250          PADD    Diagnosis 1  -BP     Gender 2  -BP     Age Group 2  -BP     Gait Distance 1  -BP     Assist Level 2  -BP     Home Support 3  -BP     PADD Score 11  -BP     Patient Preference  home with home health  -BP     Prediction by PADD Score directly home (with home health or out-patient rehab)  -BP     Row Name 03/21/23 1250 03/21/23 1249       Functional Assessment    Outcome Measure Options AM-PAC 6 Clicks Basic Mobility (PT);PADD  -BP AM-PAC 6 Clicks Basic Mobility (PT)  -BP          User Key  (r) = Recorded By, (t) = Taken By, (c) = Cosigned By    Initials Name Provider Type    BP Riki Castro, PT Physical Therapist    Martha Jade RN Registered Nurse              Physical Therapy Education     Title: PT OT SLP Therapies (Resolved)     Topic: Physical Therapy (Resolved)     Point: Mobility training (Resolved)     Learning Progress Summary           Patient Acceptance, E,TB, VU by BP at 3/21/2023 1249                   Point: Home exercise program (Resolved)     Learning Progress Summary           Patient Acceptance, E,TB, VU by BP at 3/21/2023 1249                               User Key     Initials Effective Dates Name Provider Type Discipline    BP 06/16/21 -  Riki Castro, PT Physical Therapist PT              PT Recommendation and Plan     Plan of Care Reviewed With: patient  Outcome Evaluation: PT Evaluation Complete: Patient performs sit to/from stand transfers with supervision and gait x 420 feet with one seated break with supervision with use of FWW. Patient ascends/descends 2 curb stairs with FWW with CGA. Patient manages device safely without need for cues. No knee buckling or loss of balance noted. Patient performs L TKA HEP x 10 reps, provided and reviewed written handout.  Patient performs 10 SLR's independently without extension lag. Patient initially expressed interest in STR however discussed patients current functional performance and recommendation of discharge home with home health. Patient receptive and agreeable to discharge home with home health. No further inpatient skilled PT needs at this time. Anticipate discharge home today.     Time Calculation:    PT  Charges     Row Name 03/21/23 1250             Time Calculation    Start Time 0903  -BP      Stop Time 0946  -BP      Time Calculation (min) 43 min  -BP      PT Received On 03/21/23  -BP            User Key  (r) = Recorded By, (t) = Taken By, (c) = Cosigned By    Initials Name Provider Type    BP Riki Castro, PT Physical Therapist              Therapy Charges for Today     Code Description Service Date Service Provider Modifiers Qty    [id number] HC PT EVAL LOW COMPLEXITY 3 3/21/2023 Riki Castro, PT GP 1          PT G-Codes  Outcome Measure Options: AM-PAC 6 Clicks Basic Mobility (PT), PADD  AM-PAC 6 Clicks Score (PT): 20    PT Discharge Summary  Anticipated Discharge Disposition (PT): home with home health  Reason for Discharge: Independent    Riki Castro, PT  3/21/2023

## 2023-03-21 NOTE — DISCHARGE SUMMARY
Outpatient Surgery Discharge Summary    Patient: Frederick Cruz  Medical Record #: [id number]  Age: 67 y.o.  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  Underwent appropriate preoperative testing.  Underwent regional anesthesia and did well in the operating room and throughout the recovery period.   Discharged home in good condition with PT to start soon.     Admission condition: good    Discharge condition: good    Disposition: home    Meds: medicines reconciled and prescriptions signed on chart    Activity: see discharge instruction sheet  Diet: see discharge instruction sheet  Wound Care: see discharge instruction sheet    Follow up: Dr. Mukherjee in 2-3 weeks    Signed:  TEZ Olson  3/21/2023  13:07 EDT

## 2023-03-21 NOTE — DISCHARGE INSTR - ACTIVITY
call office on 3/23/23 for instructions about removing the drain  Weight bearing as tolerated on operative knee with walker  Xarelto for DVT prophylaxis for 2 weeks, then transition to ECASA 81 mg BID for 4 weeks     Leave dressing in place for one week

## 2023-03-21 NOTE — PLAN OF CARE
Goal Outcome Evaluation:  Plan of Care Reviewed With: patient           Outcome Evaluation: PT Evaluation Complete: Patient performs sit to/from stand transfers with supervision and gait x 420 feet with one seated break with supervision with use of FWW. Patient ascends/descends 2 curb stairs with FWW with CGA. Patient manages device safely without need for cues. No knee buckling or loss of balance noted. Patient performs L TKA HEP x 10 reps, provided and reviewed written handout.  Patient performs 10 SLR's independently without extension lag. Patient initially expressed interest in STR however discussed patients current functional performance and recommendation of discharge home with home health. Patient receptive and agreeable to discharge home with home health. No further inpatient skilled PT needs at this time. Anticipate discharge home today.

## 2023-03-22 ENCOUNTER — READMISSION MANAGEMENT (OUTPATIENT)
Dept: CALL CENTER | Facility: HOSPITAL | Age: 68
End: 2023-03-22
Payer: MEDICARE

## 2023-03-22 NOTE — OUTREACH NOTE
Prep Survey    Flowsheet Row Responses   [facility] patient discharged from? [facility]   Is LACE score < 7 ? Yes   Eligibility Readm Mgmt   Discharge diagnosis : Failed total knee, right, sequela    Does the patient have one of the following disease processes/diagnoses(primary or secondary)? Total Joint Replacement   Does the patient have Home health ordered? Yes   What is the Home health agency?  [facility]   Prep survey completed? Yes          Adriana TATE - Registered Nurse

## 2023-03-22 NOTE — CASE MANAGEMENT/SOCIAL WORK
Case Management Discharge Note      Final Note: Dischared home with [facility]     Provided Post Acute Provider List?: Refused  Refused Provider List Comment: pt declined    Selected Continued Care - Discharged on 3/21/2023 Admission date: 3/20/2023 - Discharge disposition: Home or Self Care    Destination    No services have been selected for the patient.              Durable Medical Equipment    No services have been selected for the patient.              Dialysis/Infusion    No services have been selected for the patient.              Home Medical Care    No services have been selected for the patient.              Therapy    No services have been selected for the patient.              Community Resources    No services have been selected for the patient.              Community & DME    No services have been selected for the patient.                       Final Discharge Disposition Code: 06 - home with home health care

## 2023-03-23 LAB
BACTERIA SPEC AEROBE CULT: NORMAL
BACTERIA SPEC AEROBE CULT: NORMAL
GRAM STN SPEC: NORMAL

## 2023-03-25 LAB
BACTERIA SPEC AEROBE CULT: NORMAL
BACTERIA SPEC ANAEROBE CULT: NORMAL
GRAM STN SPEC: NORMAL
GRAM STN SPEC: NORMAL

## (undated) DEVICE — SOL ISO/ALC RUB 70PCT 4OZ

## (undated) DEVICE — TRAP FLD MINIVAC MEGADYNE 100ML

## (undated) DEVICE — CHISEL BLADE LATERAL 10X68X1MM -                                    R100 C: Brand: RENOVATION

## (undated) DEVICE — CEMENT MIXING SYSTEM WITH FEMORAL BREAKWAY NOZZLE: Brand: REVOLUTION

## (undated) DEVICE — SOL IRR H2O BTL 1000ML STRL

## (undated) DEVICE — DISPOSABLE DRAPE, STERILE, FOR A CDS-3072 6 FOOT TABLE: Brand: PEDIGO PRODUCTS, INC.

## (undated) DEVICE — GLV SURG SENSICARE PI LF PF 7.5 GRN STRL

## (undated) DEVICE — SPNG GZ 2S 2X2 8PLY STRL PK/2

## (undated) DEVICE — DUAL CUT SAGITTAL BLADE

## (undated) DEVICE — MAT FLR ABSORBENT LG 4FT 10 2.5FT

## (undated) DEVICE — CONTAINER,SPECIMEN,OR STERILE,4OZ: Brand: MEDLINE

## (undated) DEVICE — FRAZIER SUCTION INSTRUMENT 10 FR W/CONTROL VENT & OBTURATOR: Brand: FRAZIER

## (undated) DEVICE — THIN OFFSET (9.0 X 0.38 X 25.0MM)

## (undated) DEVICE — SUT ETHIB 0 CT1 CR8 18IN CX21D

## (undated) DEVICE — TBG PENCL TELESCP MEGADYNE SMOKE EVAC 10FT

## (undated) DEVICE — TP SXN YANKR BULB STRL

## (undated) DEVICE — SUT VIC 0 CT1 CR8 18IN J840D

## (undated) DEVICE — PATIENT RETURN ELECTRODE, SINGLE-USE, CONTACT QUALITY MONITORING, ADULT, WITH 9FT CORD, FOR PATIENTS WEIGING OVER 33LBS. (15KG): Brand: MEGADYNE

## (undated) DEVICE — DRSNG SLVR/ANTIBAC PRIMASEAL POST/OP ADHS 3.5X10IN

## (undated) DEVICE — GLV SURG SENSICARE PI ORTHO SZ8 LF STRL

## (undated) DEVICE — KT DRN EVAC WND PVC 15F3/16IN 400CC

## (undated) DEVICE — Device

## (undated) DEVICE — SPNG GZ WOVN 4X4IN 12PLY 10/BX STRL

## (undated) DEVICE — DRSNG SURESITE WNDW 4X4.5

## (undated) DEVICE — GLV SURG SENSICARE PI LF PF 8 GRN STRL

## (undated) DEVICE — 3M™ STERI-DRAPE™ U-DRAPE 1015: Brand: STERI-DRAPE™

## (undated) DEVICE — PK KN TOTL 90

## (undated) DEVICE — PROXIMATE RH ROTATING HEAD SKIN STAPLERS (35 WIDE) CONTAINS 35 STAINLESS STEEL STAPLES: Brand: PROXIMATE

## (undated) DEVICE — ELECTRD BLD EZ CLN STD 4IN

## (undated) DEVICE — 3M™ STERI-DRAPE™ INSTRUMENT POUCH 1018: Brand: STERI-DRAPE™

## (undated) DEVICE — GENESIS TROCHLEAR PIN 1/8 IN. X 5IN: Brand: GENESIS

## (undated) DEVICE — GENESIS TROCHLEAR PIN 1/8 X 3: Brand: GENESIS

## (undated) DEVICE — GLV SURG SENSICARE PI ORTHO SZ7.5 LF STRL

## (undated) DEVICE — INTENDED FOR TISSUE SEPARATION, AND OTHER PROCEDURES THAT REQUIRE A SHARP SURGICAL BLADE TO PUNCTURE OR CUT.: Brand: BARD-PARKER ® STAINLESS STEEL BLADES

## (undated) DEVICE — HOOD, PEEL-AWAY: Brand: FLYTE

## (undated) DEVICE — APPL CHLORAPREP HI/LITE 26ML ORNG

## (undated) DEVICE — DECANTER: Brand: UNBRANDED

## (undated) DEVICE — WRAP KNEE COLD THERAPY

## (undated) DEVICE — DECANT BG O JET

## (undated) DEVICE — PREP SOL POVIDONE/IODINE BT 4OZ

## (undated) DEVICE — INTENDED USE FOR SURGICAL MARKING ON INTACT SKIN, ALSO PROVIDES A PERMANENT METHOD OF IDENTIFYING OBJECTS IN THE OPERATING ROOM: Brand: WRITESITE® REGULAR TIP SKIN MARKER

## (undated) DEVICE — SYR LUERLOK 30CC